# Patient Record
Sex: FEMALE | Race: WHITE | NOT HISPANIC OR LATINO | Employment: FULL TIME | URBAN - METROPOLITAN AREA
[De-identification: names, ages, dates, MRNs, and addresses within clinical notes are randomized per-mention and may not be internally consistent; named-entity substitution may affect disease eponyms.]

---

## 2023-02-21 PROBLEM — E66.812 CLASS 2 SEVERE OBESITY DUE TO EXCESS CALORIES WITH SERIOUS COMORBIDITY AND BODY MASS INDEX (BMI) OF 35.0 TO 35.9 IN ADULT: Status: ACTIVE | Noted: 2023-02-21

## 2023-02-21 PROBLEM — I10 BENIGN ESSENTIAL HYPERTENSION: Status: ACTIVE | Noted: 2023-02-21

## 2023-02-21 PROBLEM — F41.9 ANXIETY: Status: ACTIVE | Noted: 2023-02-21

## 2023-02-21 PROBLEM — J30.2 SEASONAL ALLERGIES: Status: ACTIVE | Noted: 2023-02-21

## 2023-02-21 PROBLEM — E66.01 CLASS 2 SEVERE OBESITY DUE TO EXCESS CALORIES WITH SERIOUS COMORBIDITY AND BODY MASS INDEX (BMI) OF 35.0 TO 35.9 IN ADULT (MULTI): Status: ACTIVE | Noted: 2023-02-21

## 2023-02-21 PROBLEM — R16.1 SPLENOMEGALY: Status: ACTIVE | Noted: 2023-02-21

## 2023-02-21 PROBLEM — K21.9 ACID REFLUX: Status: ACTIVE | Noted: 2023-02-21

## 2023-02-21 PROBLEM — R91.1 NODULE OF MIDDLE LOBE OF RIGHT LUNG: Status: ACTIVE | Noted: 2023-02-21

## 2023-02-21 PROBLEM — G47.00 INSOMNIA: Status: ACTIVE | Noted: 2023-02-21

## 2023-02-21 PROBLEM — E78.2 MIXED HYPERLIPIDEMIA: Status: ACTIVE | Noted: 2023-02-21

## 2023-02-21 RX ORDER — LISINOPRIL 10 MG/1
1 TABLET ORAL DAILY
COMMUNITY
Start: 2021-11-15 | End: 2023-03-24 | Stop reason: SDUPTHER

## 2023-02-21 RX ORDER — TRAZODONE HYDROCHLORIDE 50 MG/1
1-2 TABLET ORAL NIGHTLY PRN
COMMUNITY
Start: 2021-10-26 | End: 2023-03-24

## 2023-02-21 RX ORDER — ALPRAZOLAM 0.25 MG/1
1 TABLET ORAL 3 TIMES DAILY PRN
COMMUNITY
Start: 2021-09-24 | End: 2023-03-24 | Stop reason: SDUPTHER

## 2023-03-17 ASSESSMENT — PROMIS GLOBAL HEALTH SCALE
CARRYOUT_SOCIAL_ACTIVITIES: GOOD
RATE_PHYSICAL_HEALTH: FAIR
EMOTIONAL_PROBLEMS: OFTEN
CARRYOUT_PHYSICAL_ACTIVITIES: COMPLETELY
RATE_GENERAL_HEALTH: GOOD
RATE_MENTAL_HEALTH: FAIR
RATE_QUALITY_OF_LIFE: GOOD
RATE_SOCIAL_SATISFACTION: GOOD
RATE_AVERAGE_FATIGUE: MODERATE
RATE_AVERAGE_PAIN: 0

## 2023-03-23 ASSESSMENT — ENCOUNTER SYMPTOMS
VOMITING: 0
DIZZINESS: 0
EYE REDNESS: 0
CHILLS: 0
COUGH: 0
FREQUENCY: 0
HEADACHES: 0
APPETITE CHANGE: 0
CONFUSION: 0
FATIGUE: 0
WOUND: 0
UNEXPECTED WEIGHT CHANGE: 0
DYSURIA: 0
BACK PAIN: 0
NECK PAIN: 0
ABDOMINAL PAIN: 0
EYE DISCHARGE: 0
PALPITATIONS: 0
POLYPHAGIA: 0
FEVER: 0
BLOOD IN STOOL: 0
POLYDIPSIA: 0
BRUISES/BLEEDS EASILY: 0
EYE PAIN: 0
TROUBLE SWALLOWING: 0
HEMATURIA: 0
SORE THROAT: 0
ADENOPATHY: 0
SHORTNESS OF BREATH: 0
HALLUCINATIONS: 0

## 2023-03-23 NOTE — PROGRESS NOTES
Subjective   Patient ID: Jeanne Myers is a 37 y.o. female who presents for Annual Exam (Is pt fasting? No/Does pt see any providers other than me? No /Last pap? Never had one/Does pt want pap today? No/Does pt want me to refer her to ob gyn? No/Bps at home - Averages around 130/90/Is pt taking fish oil 2 bid? yes/Did pt check insurance for coverage for the more specialized cholesterol test (NMR)? Not yet,  new insurance as well/If pt will need xanax refilled in the next 6mon then she will need new csa and uds?  Discuss it first/Last xanax use- Around December 2022/Phq9, gad7/) and Anxiety.  Is pt fasting? no  Does pt see any providers other than me? no    Last pap? never  Does pt want pap today? no  Does pt want me to refer her to ob gyn?  Bps at home 130s/90 avg  Is pt taking fish oil 2 bid? yes  Did pt check insurance for coverage for the more specialized cholesterol test (NMR)? no  If pt will need xanax refilled in the next 6mon then she will need new csa and uds?  Last xanax use-12/2022        No care team member to display    HPI  Last labs-2/2022 ldl 102, hdl 41, trigs 245; 9/2021 tsh, cbc, cmp-all nl  Due for labs- all    Phq9=11 , gad7=13  No new stresses  No meds in the past for anxiety  Therapist >10yrs ago  No psychiatrist  Fh mental illness: none    Can fall asleep  Off diet  Wants to restart exercise  No suicidal thoughts or plans    OARRS:  No data recorded  I have personally reviewed the OARRS report for Xiomara Myers. I have considered the risks of abuse, dependence, addiction and diversion    Is the patient prescribed a combination of a benzodiazepine and opioid?  No    Last Urine Drug Screen / ordered today: Yes  Recent Results (from the past 52694 hour(s))   OPIATE/OPIOID/BENZO PRESCRIPTION COMPLIANCE    Collection Time: 09/24/21  9:35 AM   Result Value Ref Range    DRUG SCREEN COMMENT URINE SEE BELOW     Creatine, Urine 146.7 mg/dL    Amphetamine Screen, Urine PRESUMPTIVE NEGATIVE NEGATIVE     Barbiturate Screen, Urine PRESUMPTIVE NEGATIVE NEGATIVE    Cannabinoid Screen, Urine PRESUMPTIVE NEGATIVE NEGATIVE    Cocaine Screen, Urine PRESUMPTIVE NEGATIVE NEGATIVE    PCP Screen, Urine PRESUMPTIVE NEGATIVE NEGATIVE    7-Aminoclonazepam <25 Cutoff <25 ng/mL    Alpha-Hydroxyalprazolam <25 Cutoff <25 ng/mL    Alpha-Hydroxymidazolam <25 Cutoff <25 ng/mL    Alprazolam <25 Cutoff <25 ng/mL    Chlordiazepoxide <25 Cutoff <25 ng/mL    Clonazepam <25 Cutoff <25 ng/mL    Diazepam <25 Cutoff <25 ng/mL    Lorazepam <25 Cutoff <25 ng/mL    Midazolam <25 Cutoff <25 ng/mL    Nordiazepam <25 Cutoff <25 ng/mL    Oxazepam <25 Cutoff <25 ng/mL    Temazepam <25 Cutoff <25 ng/mL    Zolpidem <25 Cutoff <25 ng/mL    Zolpidem Metabolite (ZCA) <25 Cutoff <25 ng/mL    6-Acetylmorphine <25 Cutoff <25 ng/mL    Codeine <50 Cutoff <50 ng/mL    Hydrocodone <25 Cutoff <25 ng/mL    Hydromorphone <25 Cutoff <25 ng/mL    Morphine Urine <50 Cutoff <50 ng/mL    Norhydrocodone <25 Cutoff <25 ng/mL    Noroxycodone <25 Cutoff <25 ng/mL    Oxycodone <25 Cutoff <25 ng/mL    Oxymorphone <25 Cutoff <25 ng/mL    Tramadol <50 Cutoff <50 ng/mL    O-Desmethyltramadol <50 Cutoff <50 ng/mL    Fentanyl <2.5 Cutoff<2.5 ng/mL    Norfentanyl <2.5 Cutoff<2.5 ng/mL    METHADONE CONFIRMATION,URINE <25 Cutoff <25 ng/mL    EDDP <25 Cutoff <25 ng/mL     N/A    Controlled Substance Agreement:  Date of the Last Agreement: today  Reviewed Controlled Substance Agreement including but not limited to the benefits, risks, and alternatives to treatment with a Controlled Substance medication(s).    Benzodiazepines:  What is the patient's goal of therapy? Decr anxiety  Is this being achieved with current treatment? no    STEFAN-7:  No data recorded    Activities of Daily Living:   Is your overall impression that this patient is benefiting (symptom reduction outweighs side effects) from benzodiazepine therapy? Yes but only temporary, starting pt on daily med-fluoxetine    1.  Physical Functioning: Same  2. Family Relationship: Same  3. Social Relationship: Same  4. Mood: Same  5. Sleep Patterns: Same  6. Overall Function: Same    Cholesterol   Date Value Ref Range Status   02/16/2022 192 0 - 199 mg/dL Final     Comment:     .      AGE      DESIRABLE   BORDERLINE HIGH   HIGH     0-19 Y     0 - 169       170 - 199     >/= 200    20-24 Y     0 - 189       190 - 224     >/= 225         >24 Y     0 - 199       200 - 239     >/= 240   **All ranges are based on fasting samples. Specific   therapeutic targets will vary based on patient-specific   cardiac risk.  .   Pediatric guidelines reference:Pediatrics 2011, 128(S5).   Adult guidelines reference: NCEP ATPIII Guidelines,     PAO 2001, 258:2486-97  .   Venipuncture immediately after or during the    administration of Metamizole may lead to falsely   low results. Testing should be performed immediately   prior to Metamizole dosing.     09/03/2021 204 (H) 0 - 199 mg/dL Final     Comment:     .      AGE      DESIRABLE   BORDERLINE HIGH   HIGH     0-19 Y     0 - 169       170 - 199     >/= 200    20-24 Y     0 - 189       190 - 224     >/= 225         >24 Y     0 - 199       200 - 239     >/= 240   **All ranges are based on fasting samples. Specific   therapeutic targets will vary based on patient-specific   cardiac risk.  .   Pediatric guidelines reference:Pediatrics 2011, 128(S5).   Adult guidelines reference: NCEP ATPIII Guidelines,     PAO 2001, 258:2486-97  .   Venipuncture immediately after or during the    administration of Metamizole may lead to falsely   low results. Testing should be performed immediately   prior to Metamizole dosing.       Triglycerides   Date Value Ref Range Status   02/16/2022 245 (H) 0 - 149 mg/dL Final     Comment:     .      AGE      DESIRABLE   BORDERLINE HIGH   HIGH     VERY HIGH   0 D-90 D    19 - 174         ----         ----        ----  91 D- 9 Y     0 -  74        75 -  99     >/= 100      ----    10-19 Y      0 -  89        90 - 129     >/= 130      ----    20-24 Y     0 - 114       115 - 149     >/= 150      ----         >24 Y     0 - 149       150 - 199    200- 499    >/= 500  .   Venipuncture immediately after or during the    administration of Metamizole may lead to falsely   low results. Testing should be performed immediately   prior to Metamizole dosing.     09/03/2021 303 (H) 0 - 149 mg/dL Final     Comment:     .      AGE      DESIRABLE   BORDERLINE HIGH   HIGH     VERY HIGH   0 D-90 D    19 - 174         ----         ----        ----  91 D- 9 Y     0 -  74        75 -  99     >/= 100      ----    10-19 Y     0 -  89        90 - 129     >/= 130      ----    20-24 Y     0 - 114       115 - 149     >/= 150      ----         >24 Y     0 - 149       150 - 199    200- 499    >/= 500  .   Venipuncture immediately after or during the    administration of Metamizole may lead to falsely   low results. Testing should be performed immediately   prior to Metamizole dosing.       HDL   Date Value Ref Range Status   02/16/2022 41.3 mg/dL Final     Comment:     .      AGE      VERY LOW   LOW     NORMAL    HIGH       0-19 Y       < 35   < 40     40-45     ----    20-24 Y       ----   < 40       >45     ----      >24 Y       ----   < 40     40-60      >60  .     09/03/2021 40.5 mg/dL Final     Comment:     .      AGE      VERY LOW   LOW     NORMAL    HIGH       0-19 Y       < 35   < 40     40-45     ----    20-24 Y       ----   < 40       >45     ----      >24 Y       ----   < 40     40-60      >60  .       No results found for: LDL  TSH   Date Value Ref Range Status   09/03/2021 2.57 0.44 - 3.98 mIU/L Final     Comment:      TSH testing is performed using different testing    methodology at [facility] than at other    [facility] hospitals. Direct result comparisons should    only be made within the same method.       No components found for: A1C  No components found for: POCA1C  No results found for: ALBUR  No  components found for: POCALBUR      Other concerns: since taking bp med, tickle at times; helps to drink more water    bps at home- no    ER/urgicare visits in the last year- none  Hospitalizations in the last year- none      last Pap- never  H/o abn pap-n/a  Frequency-longer 45d  Duration-4-6d  Heavy periods-lighter lately with higher wt  Abn uterine bleeding-none  Dysmenorrhea-none  FH ovarian, endometrial, cervical, uterine ca-none    Current birth control method-declines  No change in contraception desired    last mammo- (40-75/90) 2021  Self breast exams-occas    FH br ca-gr aunt pat      FH colon ca-none      Exercise- getting back to it  Diet-off diet   Body mass index is 39.32 kg/m².    last eye dr appt- glasses;1 1/2 yrs ago  No vision issues    last dental appt- 3mon    BMs-regular  Sleep-able to fall asleep but hard to stay asleep; no snoring or apnea; trazodone if not working caused anxiety so stopped taking it.  no cp, swelling, sob, abd pain, n/v/d/c, blood in stool or black stools  STI testing including hiv (age 15-65) and hep c screening (18-79)-declines        Immunization History   Administered Date(s) Administered    Influenza, seasonal, injectable 09/24/2021    Moderna SARS-CoV-2 Vaccination 04/14/2021, 05/15/2021, 01/10/2022    Tdap 05/22/2014       fractures in lifetime-finger, toe, arm      FH heart attack, heart surgery-pgm,pgf, dad-mi  FH stroke-none  Ct cardiac score 3/2022=0    The ASCVD Risk score (Fozia DK, et al., 2019) failed to calculate for the following reasons:    The 2019 ASCVD risk score is only valid for ages 40 to 79        Review of Systems   Constitutional:  Negative for appetite change, chills, fatigue, fever and unexpected weight change.   HENT:  Negative for congestion, ear pain, sore throat and trouble swallowing.    Eyes:  Negative for pain, discharge and redness.   Respiratory:  Negative for cough and shortness of breath.    Cardiovascular:  Negative for chest pain and  palpitations.   Gastrointestinal:  Negative for abdominal pain, blood in stool and vomiting.   Endocrine: Negative for polydipsia, polyphagia and polyuria.   Genitourinary:  Negative for dysuria, frequency, hematuria and urgency.   Musculoskeletal:  Negative for back pain and neck pain.   Skin:  Negative for rash and wound.   Allergic/Immunologic: Negative for immunocompromised state.   Neurological:  Negative for dizziness, syncope and headaches.   Hematological:  Negative for adenopathy. Does not bruise/bleed easily.   Psychiatric/Behavioral:  Negative for confusion and hallucinations.        Objective   Visit Vitals  /87   Pulse 89      BP Readings from Last 3 Encounters:   03/24/23 141/87   02/21/22 130/80   12/02/21 (!) 142/96     Wt Readings from Last 3 Encounters:   03/24/23 117 kg (258 lb 9.6 oz)   02/21/22 105 kg (231 lb 4 oz)   12/02/21 104 kg (229 lb)           Physical Exam  Constitutional:       General: She is not in acute distress.     Appearance: Normal appearance. She is not ill-appearing.   HENT:      Head: Normocephalic.      Right Ear: Tympanic membrane, ear canal and external ear normal.      Left Ear: Tympanic membrane, ear canal and external ear normal.      Nose: Nose normal.      Mouth/Throat:      Mouth: Mucous membranes are moist.      Pharynx: Oropharynx is clear.   Eyes:      Extraocular Movements: Extraocular movements intact.      Conjunctiva/sclera: Conjunctivae normal.      Pupils: Pupils are equal, round, and reactive to light.   Cardiovascular:      Rate and Rhythm: Normal rate and regular rhythm.      Heart sounds: Normal heart sounds. No murmur heard.  Pulmonary:      Effort: Pulmonary effort is normal. No respiratory distress.      Breath sounds: Normal breath sounds. No wheezing, rhonchi or rales.   Abdominal:      General: Bowel sounds are normal.      Palpations: Abdomen is soft. There is no mass.      Tenderness: There is no abdominal tenderness.   Musculoskeletal:          General: No swelling or tenderness. Normal range of motion.      Cervical back: Normal range of motion and neck supple.      Right lower leg: No edema.      Left lower leg: No edema.   Skin:     General: Skin is warm.      Findings: No rash.   Neurological:      General: No focal deficit present.      Mental Status: She is alert and oriented to person, place, and time.      Cranial Nerves: No cranial nerve deficit.      Motor: No weakness.   Psychiatric:         Mood and Affect: Mood normal.         Behavior: Behavior normal.       Assessment/Plan   Diagnoses and all orders for this visit:  Routine general medical examination at a health care facility  -     Comprehensive Metabolic Panel; Future  -     CBC and Auto Differential; Future  -     Lipid Panel; Future  -     TSH with reflex to Free T4 if abnormal; Future  Encounter for screening mammogram for malignant neoplasm of breast  -     BI mammo bilateral screening tomosynthesis; Future  Anxiety  -     ALPRAZolam (Xanax) 0.25 mg tablet; Take 1 tablet (0.25 mg) by mouth 3 times a day as needed for anxiety.  -     FLUoxetine (PROzac) 10 mg capsule; Take 1 capsule (10 mg) by mouth once daily.  Benign essential hypertension  -     lisinopril 10 mg tablet; Take 1 tablet (10 mg) by mouth once daily.  Mixed hyperlipidemia  BMI 39.0-39.9,adult  Class 2 severe obesity due to excess calories with serious comorbidity and body mass index (BMI) of 39.0 to 39.9 in adult (CMS/[facility])

## 2023-03-24 ENCOUNTER — OFFICE VISIT (OUTPATIENT)
Dept: PRIMARY CARE | Facility: CLINIC | Age: 38
End: 2023-03-24
Payer: COMMERCIAL

## 2023-03-24 VITALS
DIASTOLIC BLOOD PRESSURE: 68 MMHG | BODY MASS INDEX: 39.19 KG/M2 | HEIGHT: 68 IN | WEIGHT: 258.6 LBS | OXYGEN SATURATION: 97 % | HEART RATE: 89 BPM | SYSTOLIC BLOOD PRESSURE: 138 MMHG

## 2023-03-24 DIAGNOSIS — E78.2 MIXED HYPERLIPIDEMIA: ICD-10-CM

## 2023-03-24 DIAGNOSIS — F41.9 ANXIETY: ICD-10-CM

## 2023-03-24 DIAGNOSIS — E66.01 CLASS 2 SEVERE OBESITY DUE TO EXCESS CALORIES WITH SERIOUS COMORBIDITY AND BODY MASS INDEX (BMI) OF 39.0 TO 39.9 IN ADULT (MULTI): ICD-10-CM

## 2023-03-24 DIAGNOSIS — Z00.00 ROUTINE GENERAL MEDICAL EXAMINATION AT A HEALTH CARE FACILITY: Primary | ICD-10-CM

## 2023-03-24 DIAGNOSIS — I10 BENIGN ESSENTIAL HYPERTENSION: ICD-10-CM

## 2023-03-24 DIAGNOSIS — Z12.31 ENCOUNTER FOR SCREENING MAMMOGRAM FOR MALIGNANT NEOPLASM OF BREAST: ICD-10-CM

## 2023-03-24 DIAGNOSIS — Z79.899 MEDICATION MANAGEMENT: ICD-10-CM

## 2023-03-24 PROBLEM — E66.812 CLASS 2 SEVERE OBESITY DUE TO EXCESS CALORIES WITH SERIOUS COMORBIDITY AND BODY MASS INDEX (BMI) OF 35.0 TO 35.9 IN ADULT: Status: RESOLVED | Noted: 2023-02-21 | Resolved: 2023-03-24

## 2023-03-24 PROCEDURE — 3079F DIAST BP 80-89 MM HG: CPT | Performed by: NURSE PRACTITIONER

## 2023-03-24 PROCEDURE — 80373 DRUG SCREENING TRAMADOL: CPT

## 2023-03-24 PROCEDURE — 1036F TOBACCO NON-USER: CPT | Performed by: NURSE PRACTITIONER

## 2023-03-24 PROCEDURE — 99395 PREV VISIT EST AGE 18-39: CPT | Performed by: NURSE PRACTITIONER

## 2023-03-24 PROCEDURE — 3075F SYST BP GE 130 - 139MM HG: CPT | Performed by: NURSE PRACTITIONER

## 2023-03-24 PROCEDURE — 80358 DRUG SCREENING METHADONE: CPT

## 2023-03-24 PROCEDURE — 80346 BENZODIAZEPINES1-12: CPT

## 2023-03-24 PROCEDURE — 80361 OPIATES 1 OR MORE: CPT

## 2023-03-24 PROCEDURE — 80365 DRUG SCREENING OXYCODONE: CPT

## 2023-03-24 PROCEDURE — 80354 DRUG SCREENING FENTANYL: CPT

## 2023-03-24 PROCEDURE — 3077F SYST BP >= 140 MM HG: CPT | Performed by: NURSE PRACTITIONER

## 2023-03-24 PROCEDURE — 3008F BODY MASS INDEX DOCD: CPT | Performed by: NURSE PRACTITIONER

## 2023-03-24 PROCEDURE — 3078F DIAST BP <80 MM HG: CPT | Performed by: NURSE PRACTITIONER

## 2023-03-24 PROCEDURE — 80368 SEDATIVE HYPNOTICS: CPT

## 2023-03-24 PROCEDURE — 80307 DRUG TEST PRSMV CHEM ANLYZR: CPT

## 2023-03-24 RX ORDER — LISINOPRIL 10 MG/1
10 TABLET ORAL DAILY
Qty: 90 TABLET | Refills: 1 | Status: SHIPPED | OUTPATIENT
Start: 2023-03-24 | End: 2023-04-14 | Stop reason: SINTOL

## 2023-03-24 RX ORDER — ALPRAZOLAM 0.25 MG/1
0.25 TABLET ORAL 3 TIMES DAILY PRN
Qty: 30 TABLET | Refills: 1 | Status: SHIPPED | OUTPATIENT
Start: 2023-03-24 | End: 2024-04-18 | Stop reason: SDUPTHER

## 2023-03-24 RX ORDER — FLUOXETINE 10 MG/1
10 CAPSULE ORAL DAILY
Qty: 30 CAPSULE | Refills: 1 | Status: SHIPPED | OUTPATIENT
Start: 2023-03-24 | End: 2023-04-14 | Stop reason: SDUPTHER

## 2023-03-24 ASSESSMENT — PATIENT HEALTH QUESTIONNAIRE - PHQ9
4. FEELING TIRED OR HAVING LITTLE ENERGY: SEVERAL DAYS
SUM OF ALL RESPONSES TO PHQ9 QUESTIONS 1 AND 2: 1
10. IF YOU CHECKED OFF ANY PROBLEMS, HOW DIFFICULT HAVE THESE PROBLEMS MADE IT FOR YOU TO DO YOUR WORK, TAKE CARE OF THINGS AT HOME, OR GET ALONG WITH OTHER PEOPLE: SOMEWHAT DIFFICULT
7. TROUBLE CONCENTRATING ON THINGS, SUCH AS READING THE NEWSPAPER OR WATCHING TELEVISION: MORE THAN HALF THE DAYS
1. LITTLE INTEREST OR PLEASURE IN DOING THINGS: SEVERAL DAYS
SUM OF ALL RESPONSES TO PHQ QUESTIONS 1-9: 11
5. POOR APPETITE OR OVEREATING: NEARLY EVERY DAY
3. TROUBLE FALLING OR STAYING ASLEEP OR SLEEPING TOO MUCH: NEARLY EVERY DAY
6. FEELING BAD ABOUT YOURSELF - OR THAT YOU ARE A FAILURE OR HAVE LET YOURSELF OR YOUR FAMILY DOWN: SEVERAL DAYS
2. FEELING DOWN, DEPRESSED OR HOPELESS: NOT AT ALL
9. THOUGHTS THAT YOU WOULD BE BETTER OFF DEAD, OR OF HURTING YOURSELF: NOT AT ALL
8. MOVING OR SPEAKING SO SLOWLY THAT OTHER PEOPLE COULD HAVE NOTICED. OR THE OPPOSITE, BEING SO FIGETY OR RESTLESS THAT YOU HAVE BEEN MOVING AROUND A LOT MORE THAN USUAL: NOT AT ALL

## 2023-03-24 ASSESSMENT — ANXIETY QUESTIONNAIRES
3. WORRYING TOO MUCH ABOUT DIFFERENT THINGS: MORE THAN HALF THE DAYS
1. FEELING NERVOUS, ANXIOUS, OR ON EDGE: NEARLY EVERY DAY
5. BEING SO RESTLESS THAT IT IS HARD TO SIT STILL: MORE THAN HALF THE DAYS
GAD7 TOTAL SCORE: 13
4. TROUBLE RELAXING: MORE THAN HALF THE DAYS
6. BECOMING EASILY ANNOYED OR IRRITABLE: MORE THAN HALF THE DAYS
2. NOT BEING ABLE TO STOP OR CONTROL WORRYING: MORE THAN HALF THE DAYS
7. FEELING AFRAID AS IF SOMETHING AWFUL MIGHT HAPPEN: NOT AT ALL

## 2023-03-24 NOTE — PATIENT INSTRUCTIONS
Check insurance coverage for nmr [id number] and [id number]    Meds refilled. The number of refills on the meds match when you need to return to the office for an appt. I recommend making your next appt today so you don't run out of your medication as it may take me up to 3 days to refill it.    Bring in 7d of bps to next appt  Goal <140/<90    Start fluoxetine  Use xanax as needed  Call if sx worsen or change     Handouts given to pt:  physical handout    stop smoking    Need records from:    I recommend signing up for MyChart.    Labs:    You can use the lab in our building when fasting. The hrs are: Monday-Thursday, 7 a.m. - 6 p.m., Friday, 7 a.m. - 5 p.m., Saturday 8 a.m. - 12 noon.   No appt needed, BUT YOU DO NEED THE PAPER ORDER.    Fasting is no food, drink, gum or mints other than water for 12 hrs.   Results will be back in 2-3 business days for most labs. It is always recommended for any orders (labs, xrays, ultrasounds,MRI, ct scan, procedures etc) to check with your insurance provider for expected costs or expenses to you.     Screenings:  Pap results back in 7-14 days.  To set up mammogram, call [phone]    You will get your results via phone from my medical assistant if you do not have MyChart.  OR  You will get your results via [employer]    If a result is urgent, I will call to speak to you.    Vaccines:  ---- Tdap    ---- Flu shot    ---- Shingrix    ---- [id number]    General recommendations:  Exercise-cardio 4-5d/wk 30min each day  Diet-Breakfast-toast (my favorite Nichelle Alan Delighful Multigrain or Mann's Killer Bread Good Seed thin-sliced)/bagel/English muffin-whole wheat flour as a 1st ingredient or cereal/oatmeal/granola bar-fiber 4g or more or protein like eggs or peanut butter; optional veggies  Lunch-protein, 1/2c carb or 2 slices bread, veg 1c  Dinner-protein, fist sized carb, veg 1c  Fruit 2 a day  Dairy 2 a day-milk, soy milk, almond milk, cheese, yogurt, cottage  cheese  Snacks-Protein-hard boiled egg, nuts (walnuts/almonds/pecans/pistachios 1/4c), hummus, beef/deer jerky or meat sticks; vegetable, fruit, dairy-milk(1%, skim, almond, soy)/cheese (not a lot of cheddar)/yogurt (Greek is best-my favorite Dannon Fruit on the Bottom Greek)/cottage cheese 2%; triscuits/ popcorn/wheat thins have a lot of fiber; follow serving size on bag/box/container  increase water  Limit alcohol to 1 drink per day for women and 2 drinks per day for men (1 drink=12oz beer or 5oz wine or 1 1/2oz liquor)  Calcium: 500mg 1 twice a day if age 50 and younger and 600mg 1 twice a day if over age 50 (calcium citrate can be taken without food)  Vitamin D: 800-5000 IU/day  Limit salt to <2300mg a day if age 50 and under and <1500mg a day if over age 50/have high bp or diabetes or kidney disease  Recommend folate for childbearing age women 0.4mg per day (can be found in a multivitamin)  Recommend 18mg/dL of iron a day if age 50 and under and 8mg/dL a day if over age 50; take on an empty stomach at bedtime  Use sunscreen   Wear seatbelt  Recommend safe sex practices: using condoms everytime you have sex, discuss with a new partner about their past partners/history of STDs/drug use, avoid drinking alcohol or using drugs as this increases the chance that you will participate in high-risk sex, for oral sex help protect your mouth by having your partner use a condom (male or female), women should not douche after sex, be aware of your partner's body and your body-look for signs of a sore, blister, rash, or discharge, and have regular exams and periodic tests for STDs.  No distracted driving  No driving when under influence of substances  Wear a seatbelt  Eye dr every 1-2yrs  Dentist every 6-12 mon  Tetanus shot every 10yrs  Recommend flu vaccine in the fall  Appt in 3wks for anxiety/bp and 1 year for physical      I will communicate with you via [employer] regarding messages and results. If you need help with this,  you can call the support line at [phone].    IT WAS A PLEASURE TO SEE YOU TODAY. THANK YOU FOR CHOOSING US FOR YOUR HEALTHCARE NEEDS.

## 2023-03-30 LAB
6-ACETYLMORPHINE: <25 NG/ML
7-AMINOCLONAZEPAM: <25 NG/ML
ALPHA-HYDROXYALPRAZOLAM: <25 NG/ML
ALPHA-HYDROXYMIDAZOLAM: <25 NG/ML
ALPRAZOLAM: <25 NG/ML
AMPHETAMINE (PRESENCE) IN URINE BY SCREEN METHOD: NORMAL
BARBITURATES PRESENCE IN URINE BY SCREEN METHOD: NORMAL
CANNABINOIDS IN URINE BY SCREEN METHOD: NORMAL
CHLORDIAZEPOXIDE: <25 NG/ML
CLONAZEPAM: <25 NG/ML
COCAINE (PRESENCE) IN URINE BY SCREEN METHOD: NORMAL
CODEINE: <50 NG/ML
CREATINE, URINE FOR DRUG: 126.3 MG/DL
DIAZEPAM: <25 NG/ML
DRUG SCREEN COMMENT URINE: NORMAL
EDDP: <25 NG/ML
FENTANYL CONFIRMATION, URINE: <2.5 NG/ML
HYDROCODONE: <25 NG/ML
HYDROMORPHONE: <25 NG/ML
LORAZEPAM: <25 NG/ML
METHADONE CONFIRMATION,URINE: <25 NG/ML
MIDAZOLAM: <25 NG/ML
MORPHINE URINE: <50 NG/ML
NORDIAZEPAM: <25 NG/ML
NORFENTANYL: <2.5 NG/ML
NORHYDROCODONE: <25 NG/ML
NOROXYCODONE: <25 NG/ML
O-DESMETHYLTRAMADOL: <50 NG/ML
OXAZEPAM: <25 NG/ML
OXYCODONE: <25 NG/ML
OXYMORPHONE: <25 NG/ML
PHENCYCLIDINE (PRESENCE) IN URINE BY SCREEN METHOD: NORMAL
TEMAZEPAM: <25 NG/ML
TRAMADOL: <50 NG/ML
ZOLPIDEM METABOLITE (ZCA): <25 NG/ML
ZOLPIDEM: <25 NG/ML

## 2023-04-13 ASSESSMENT — ENCOUNTER SYMPTOMS
SHORTNESS OF BREATH: 0
WHEEZING: 0
CONSTITUTIONAL NEGATIVE: 1

## 2023-04-13 NOTE — PROGRESS NOTES
Subjective   Patient ID: Jeanne Myers is a 37 y.o. female who presents for Follow-up (Medication follow-up blood pressures lisinopril making her cough on and off. Wakes her up at night./Bp's Not checking/Last used xanax 03/28/Fasting-No).  Last physical: 3/24/23  Bps at home-none  Last use of xanax-3/28/23  Is pt fasting? no  Phq9, gad7    HPI  On lisinopril 10mg a day-making her cough on off, even at night    Seen 3/24/23 for anxiety and started fluoxetine  Not sure if making body tired  3/24/23 Phq9=11, today= 5  3/24/23 gad7=13, today=3  Last use of xanax  No new stresses  No meds in the past for anxiety  Therapist >10yrs ago  No psychiatrist  Fh mental illness: none    Can process things despite anxiety  Better attitude  Easier to manage thruout the day     Can fall asleep  Off diet  Wants to restart exercise  No suicidal thoughts or plans     OARRS:  No data recorded  I have personally reviewed the OARRS report for Xiomara Myers. I have considered the risks of abuse, dependence, addiction and diversion     Is the patient prescribed a combination of a benzodiazepine and opioid?  No     Last Urine Drug Screen / ordered today: 3/24/23  Csa 3/24/23         no delusions, hallucinations, uncontrollable/purposeless mvmts, or fixed/inflexible posture  no extreme mood swings that include emotional highs and lows,      Abnormally upbeat, jumpy or wired,      Increased activity, energy or agitation,      Exaggerated sense of well-being and self-confidence (euphoria),      Irritable,      Decreased need for sleep,      Unusual talkativeness,      Racing thoughts,      Distractibility,      Poor decision-making - for example, going on buying sprees, taking sexual risks or making foolish investments           affects sleep, energy, activity, judgment, behavior and the ability to think clearly.          No care team member to display     Review of Systems   Constitutional: Negative.    Respiratory:  Negative for shortness  of breath and wheezing.    Cardiovascular:  Negative for chest pain.       Objective   Visit Vitals  BP (!) 154/96   Pulse 85   Temp 37 °C (98.6 °F) (Oral)      BP Readings from Last 3 Encounters:   04/14/23 (!) 154/96   03/24/23 138/68   02/21/22 130/80     Wt Readings from Last 3 Encounters:   04/14/23 117 kg (259 lb)   03/24/23 117 kg (258 lb 9.6 oz)   02/21/22 105 kg (231 lb 4 oz)       Physical Exam  Constitutional:       General: She is not in acute distress.     Appearance: Normal appearance.   Neurological:      Mental Status: She is alert.         Assessment/Plan   Diagnoses and all orders for this visit:  Benign essential hypertension  -     losartan (Cozaar) 50 mg tablet; Take 1 tablet (50 mg) by mouth once daily.  Anxiety  -     FLUoxetine (PROzac) 10 mg capsule; Take 1 capsule (10 mg) by mouth once daily.  Other orders  -     Follow Up In Primary Care  -     Follow Up In Primary Care; Future

## 2023-04-14 ENCOUNTER — OFFICE VISIT (OUTPATIENT)
Dept: PRIMARY CARE | Facility: CLINIC | Age: 38
End: 2023-04-14
Payer: COMMERCIAL

## 2023-04-14 VITALS
HEIGHT: 68 IN | BODY MASS INDEX: 39.25 KG/M2 | WEIGHT: 259 LBS | OXYGEN SATURATION: 96 % | DIASTOLIC BLOOD PRESSURE: 74 MMHG | SYSTOLIC BLOOD PRESSURE: 126 MMHG | TEMPERATURE: 98.6 F | HEART RATE: 85 BPM

## 2023-04-14 DIAGNOSIS — F41.9 ANXIETY: ICD-10-CM

## 2023-04-14 DIAGNOSIS — I10 BENIGN ESSENTIAL HYPERTENSION: Primary | ICD-10-CM

## 2023-04-14 PROCEDURE — 99213 OFFICE O/P EST LOW 20 MIN: CPT | Performed by: NURSE PRACTITIONER

## 2023-04-14 PROCEDURE — 3080F DIAST BP >= 90 MM HG: CPT | Performed by: NURSE PRACTITIONER

## 2023-04-14 PROCEDURE — 3074F SYST BP LT 130 MM HG: CPT | Performed by: NURSE PRACTITIONER

## 2023-04-14 PROCEDURE — 3077F SYST BP >= 140 MM HG: CPT | Performed by: NURSE PRACTITIONER

## 2023-04-14 PROCEDURE — 1036F TOBACCO NON-USER: CPT | Performed by: NURSE PRACTITIONER

## 2023-04-14 PROCEDURE — 3078F DIAST BP <80 MM HG: CPT | Performed by: NURSE PRACTITIONER

## 2023-04-14 PROCEDURE — 3008F BODY MASS INDEX DOCD: CPT | Performed by: NURSE PRACTITIONER

## 2023-04-14 RX ORDER — FLUOXETINE 10 MG/1
10 CAPSULE ORAL DAILY
Qty: 30 CAPSULE | Refills: 5 | Status: SHIPPED | OUTPATIENT
Start: 2023-04-14 | End: 2023-05-31 | Stop reason: SINTOL

## 2023-04-14 RX ORDER — LOSARTAN POTASSIUM 50 MG/1
50 TABLET ORAL DAILY
Qty: 30 TABLET | Refills: 5 | Status: SHIPPED | OUTPATIENT
Start: 2023-04-14 | End: 2023-05-04 | Stop reason: ALTCHOICE

## 2023-04-14 ASSESSMENT — PATIENT HEALTH QUESTIONNAIRE - PHQ9
10. IF YOU CHECKED OFF ANY PROBLEMS, HOW DIFFICULT HAVE THESE PROBLEMS MADE IT FOR YOU TO DO YOUR WORK, TAKE CARE OF THINGS AT HOME, OR GET ALONG WITH OTHER PEOPLE: NOT DIFFICULT AT ALL
1. LITTLE INTEREST OR PLEASURE IN DOING THINGS: NOT AT ALL
6. FEELING BAD ABOUT YOURSELF - OR THAT YOU ARE A FAILURE OR HAVE LET YOURSELF OR YOUR FAMILY DOWN: NOT AT ALL
5. POOR APPETITE OR OVEREATING: NOT AT ALL
4. FEELING TIRED OR HAVING LITTLE ENERGY: MORE THAN HALF THE DAYS
1. LITTLE INTEREST OR PLEASURE IN DOING THINGS: SEVERAL DAYS
7. TROUBLE CONCENTRATING ON THINGS, SUCH AS READING THE NEWSPAPER OR WATCHING TELEVISION: NOT AT ALL
2. FEELING DOWN, DEPRESSED OR HOPELESS: NOT AT ALL
9. THOUGHTS THAT YOU WOULD BE BETTER OFF DEAD, OR OF HURTING YOURSELF: NOT AT ALL
3. TROUBLE FALLING OR STAYING ASLEEP OR SLEEPING TOO MUCH: MORE THAN HALF THE DAYS
SUM OF ALL RESPONSES TO PHQ9 QUESTIONS 1 AND 2: 1
2. FEELING DOWN, DEPRESSED OR HOPELESS: NOT AT ALL
SUM OF ALL RESPONSES TO PHQ9 QUESTIONS 1 AND 2: 0

## 2023-04-14 ASSESSMENT — ANXIETY QUESTIONNAIRES
3. WORRYING TOO MUCH ABOUT DIFFERENT THINGS: NOT AT ALL
4. TROUBLE RELAXING: SEVERAL DAYS
2. NOT BEING ABLE TO STOP OR CONTROL WORRYING: NOT AT ALL
6. BECOMING EASILY ANNOYED OR IRRITABLE: NOT AT ALL
5. BEING SO RESTLESS THAT IT IS HARD TO SIT STILL: NOT AT ALL
1. FEELING NERVOUS, ANXIOUS, OR ON EDGE: SEVERAL DAYS
GAD7 TOTAL SCORE: 2
7. FEELING AFRAID AS IF SOMETHING AWFUL MIGHT HAPPEN: NOT AT ALL

## 2023-04-14 NOTE — PATIENT INSTRUCTIONS
Stop lisinopril  Start losartan  We will message you in 2wks to get bp     Continue fluoxetine    Return in 6mon for follow up      I will communicate with you via [employer] regarding messages and results. If you need help with this, you can call the support line at [phone].    IT WAS A PLEASURE TO SEE YOU TODAY. THANK YOU FOR CHOOSING US FOR YOUR HEALTHCARE NEEDS.

## 2023-05-04 DIAGNOSIS — I10 BENIGN ESSENTIAL HYPERTENSION: Primary | ICD-10-CM

## 2023-05-04 RX ORDER — LOSARTAN POTASSIUM 100 MG/1
100 TABLET ORAL DAILY
Qty: 30 TABLET | Refills: 1 | Status: SHIPPED | OUTPATIENT
Start: 2023-05-04 | End: 2023-05-30

## 2023-05-27 DIAGNOSIS — I10 BENIGN ESSENTIAL HYPERTENSION: ICD-10-CM

## 2023-05-30 ENCOUNTER — TELEPHONE (OUTPATIENT)
Dept: PRIMARY CARE | Facility: CLINIC | Age: 38
End: 2023-05-30
Payer: COMMERCIAL

## 2023-05-30 RX ORDER — LOSARTAN POTASSIUM 100 MG/1
TABLET ORAL
Qty: 30 TABLET | Refills: 5 | Status: SHIPPED | OUTPATIENT
Start: 2023-05-30 | End: 2023-11-27

## 2023-05-31 DIAGNOSIS — F41.9 ANXIETY: Primary | ICD-10-CM

## 2023-05-31 RX ORDER — SERTRALINE HYDROCHLORIDE 50 MG/1
TABLET, FILM COATED ORAL
Qty: 30 TABLET | Refills: 5 | Status: SHIPPED | OUTPATIENT
Start: 2023-05-31 | End: 2023-06-22

## 2023-06-22 DIAGNOSIS — F41.9 ANXIETY: ICD-10-CM

## 2023-06-22 RX ORDER — SERTRALINE HYDROCHLORIDE 50 MG/1
TABLET, FILM COATED ORAL
Qty: 90 TABLET | Refills: 2 | Status: SHIPPED | OUTPATIENT
Start: 2023-06-22 | End: 2024-04-17 | Stop reason: ALTCHOICE

## 2023-07-05 ASSESSMENT — ENCOUNTER SYMPTOMS
SHORTNESS OF BREATH: 0
CONSTITUTIONAL NEGATIVE: 1
WHEEZING: 0

## 2023-07-05 NOTE — PROGRESS NOTES
Subjective   Patient ID: Jeanne Myers is a 38 y.o. female who presents for Follow-up (Pt is here for F/U medication /Bps at home- yes/Last use of xanax- 2 month /Is pt fasting?  no/Did she do the fasting labs from march? No /).  Last physical: 3/24/23  Bps at home-yes 120s/80s  Last use of xanax-2mon ago  Is pt fasting? no  Did she do the fasting labs from march? no    Phq9, gad7    HPI  On lisinopril 10mg a day-made her cough on off, even at night  Started losartan 50mg 4/14/23 and incr losartan to 100mg on 5/4/23    Seen 3/24/23 and 4/14/23   Stopped fluoxetine 5/30/23 due to body tension and started sertraline 25mg daily and now is on 50mg daily  Wt gain, fatigue and not helping anxiety and depression as well as the fluoxetine    3/24/23 Phq9=11, 4/14/23= 5, today=13  3/24/23 gad7=13, 4/14/23=3, today=8    Xpmxniwz-jikyqhbtn-tajf,peloton, rower  Breakfast-coffee or latte, pb Total [employer]ox sandwich cookie  Lunch-bad recently, water with flavoring  Dinner-bad recently, water  Etoh rare    No new stresses  No meds in the past for anxiety  Therapist >10yrs ago  No psychiatrist  Fh mental illness: none    Can process things despite anxiety  Better attitude  Easier to manage thruout the day     Can fall asleep  Off diet  Wants to restart exercise  No suicidal thoughts or plans     OARRS:  No data recorded  I have personally reviewed the OARRS report for Xiomara Myers. I have considered the risks of abuse, dependence, addiction and diversion     Is the patient prescribed a combination of a benzodiazepine and opioid?  No     Last Urine Drug Screen / ordered today: 3/24/23  Csa 3/24/23         no delusions, hallucinations, uncontrollable/purposeless mvmts, or fixed/inflexible posture  no extreme mood swings that include emotional highs and lows,      Abnormally upbeat, jumpy or wired,      Increased activity, energy or agitation,      Exaggerated sense of well-being and self-confidence (euphoria),      Irritable,       Decreased need for sleep,      Unusual talkativeness,      Racing thoughts,      Distractibility,      Poor decision-making - for example, going on buying sprees, taking sexual risks or making foolish investments           affects sleep, energy, activity, judgment, behavior and the ability to think clearly.          No care team member to display     Review of Systems   Constitutional: Negative.    Respiratory:  Negative for shortness of breath and wheezing.    Cardiovascular:  Negative for chest pain.       Objective   Visit Vitals  BP (!) 151/96   Pulse 86   Temp 36.2 °C (97.1 °F)      BP Readings from Last 3 Encounters:   07/06/23 (!) 151/96   04/14/23 126/74   03/24/23 138/68     Wt Readings from Last 3 Encounters:   07/06/23 121 kg (267 lb)   04/14/23 117 kg (259 lb)   03/24/23 117 kg (258 lb 9.6 oz)       Physical Exam  Constitutional:       General: She is not in acute distress.     Appearance: Normal appearance.   Neurological:      Mental Status: She is alert.         Assessment/Plan     1. Anxiety  Stop sertraline  Start lexapro    2. Benign essential hypertension      3. Mixed hyperlipidemia      4. BMI 40.0-44.9, adult (CMS/HCC)      5. Morbid obesity (CMS/HCC)

## 2023-07-06 ENCOUNTER — OFFICE VISIT (OUTPATIENT)
Dept: PRIMARY CARE | Facility: CLINIC | Age: 38
End: 2023-07-06
Payer: COMMERCIAL

## 2023-07-06 VITALS
OXYGEN SATURATION: 95 % | DIASTOLIC BLOOD PRESSURE: 91 MMHG | TEMPERATURE: 97.1 F | HEIGHT: 68 IN | SYSTOLIC BLOOD PRESSURE: 147 MMHG | WEIGHT: 267 LBS | BODY MASS INDEX: 40.47 KG/M2 | HEART RATE: 86 BPM

## 2023-07-06 DIAGNOSIS — E78.2 MIXED HYPERLIPIDEMIA: ICD-10-CM

## 2023-07-06 DIAGNOSIS — F41.9 ANXIETY: Primary | ICD-10-CM

## 2023-07-06 DIAGNOSIS — I10 BENIGN ESSENTIAL HYPERTENSION: ICD-10-CM

## 2023-07-06 DIAGNOSIS — E66.01 MORBID OBESITY (MULTI): ICD-10-CM

## 2023-07-06 PROCEDURE — 3080F DIAST BP >= 90 MM HG: CPT | Performed by: NURSE PRACTITIONER

## 2023-07-06 PROCEDURE — 3008F BODY MASS INDEX DOCD: CPT | Performed by: NURSE PRACTITIONER

## 2023-07-06 PROCEDURE — 3077F SYST BP >= 140 MM HG: CPT | Performed by: NURSE PRACTITIONER

## 2023-07-06 PROCEDURE — 99214 OFFICE O/P EST MOD 30 MIN: CPT | Performed by: NURSE PRACTITIONER

## 2023-07-06 PROCEDURE — 1036F TOBACCO NON-USER: CPT | Performed by: NURSE PRACTITIONER

## 2023-07-06 RX ORDER — CALCIUM ACETATE 668 MG
500 TABLET ORAL
COMMUNITY

## 2023-07-06 RX ORDER — ESCITALOPRAM OXALATE 5 MG/1
5 TABLET ORAL DAILY
Qty: 30 TABLET | Refills: 1 | Status: SHIPPED | OUTPATIENT
Start: 2023-07-06 | End: 2023-07-28

## 2023-07-06 RX ORDER — GLUCOSAM/CHONDRO/HERB 149/HYAL 750-100 MG
TABLET ORAL
COMMUNITY
End: 2024-04-19 | Stop reason: SDUPTHER

## 2023-07-06 ASSESSMENT — PATIENT HEALTH QUESTIONNAIRE - PHQ9
SUM OF ALL RESPONSES TO PHQ9 QUESTIONS 1 AND 2: 3
7. TROUBLE CONCENTRATING ON THINGS, SUCH AS READING THE NEWSPAPER OR WATCHING TELEVISION: MORE THAN HALF THE DAYS
8. MOVING OR SPEAKING SO SLOWLY THAT OTHER PEOPLE COULD HAVE NOTICED. OR THE OPPOSITE, BEING SO FIGETY OR RESTLESS THAT YOU HAVE BEEN MOVING AROUND A LOT MORE THAN USUAL: NOT AT ALL
9. THOUGHTS THAT YOU WOULD BE BETTER OFF DEAD, OR OF HURTING YOURSELF: NOT AT ALL
3. TROUBLE FALLING OR STAYING ASLEEP OR SLEEPING TOO MUCH: NEARLY EVERY DAY
5. POOR APPETITE OR OVEREATING: MORE THAN HALF THE DAYS
4. FEELING TIRED OR HAVING LITTLE ENERGY: MORE THAN HALF THE DAYS
10. IF YOU CHECKED OFF ANY PROBLEMS, HOW DIFFICULT HAVE THESE PROBLEMS MADE IT FOR YOU TO DO YOUR WORK, TAKE CARE OF THINGS AT HOME, OR GET ALONG WITH OTHER PEOPLE: SOMEWHAT DIFFICULT
1. LITTLE INTEREST OR PLEASURE IN DOING THINGS: MORE THAN HALF THE DAYS
2. FEELING DOWN, DEPRESSED OR HOPELESS: SEVERAL DAYS
SUM OF ALL RESPONSES TO PHQ QUESTIONS 1-9: 13
6. FEELING BAD ABOUT YOURSELF - OR THAT YOU ARE A FAILURE OR HAVE LET YOURSELF OR YOUR FAMILY DOWN: SEVERAL DAYS

## 2023-07-06 ASSESSMENT — ANXIETY QUESTIONNAIRES
3. WORRYING TOO MUCH ABOUT DIFFERENT THINGS: SEVERAL DAYS
GAD7 TOTAL SCORE: 8
7. FEELING AFRAID AS IF SOMETHING AWFUL MIGHT HAPPEN: SEVERAL DAYS
4. TROUBLE RELAXING: SEVERAL DAYS
6. BECOMING EASILY ANNOYED OR IRRITABLE: SEVERAL DAYS
5. BEING SO RESTLESS THAT IT IS HARD TO SIT STILL: MORE THAN HALF THE DAYS
IF YOU CHECKED OFF ANY PROBLEMS ON THIS QUESTIONNAIRE, HOW DIFFICULT HAVE THESE PROBLEMS MADE IT FOR YOU TO DO YOUR WORK, TAKE CARE OF THINGS AT HOME, OR GET ALONG WITH OTHER PEOPLE: SOMEWHAT DIFFICULT
2. NOT BEING ABLE TO STOP OR CONTROL WORRYING: SEVERAL DAYS
1. FEELING NERVOUS, ANXIOUS, OR ON EDGE: SEVERAL DAYS

## 2023-07-06 NOTE — PATIENT INSTRUCTIONS
Stop sertraline  Start lexapro  We will message you in 2wks    [id number] jonn  Decr carbs and sugars  Continue exercise    You can use the lab in our building when fasting. The hrs are: Monday-Thursday, 7 a.m. - 6 p.m., Friday, 7 a.m. - 5 p.m., Saturday 8 a.m. - 12 noon.   No appt needed, BUT YOU DO NEED THE PAPER ORDER.    Fasting is no food, drink, gum or mints other than water for 8-12 hrs.   Results will be back in 2-3 business days for most labs. It is always recommended for any orders (labs, xrays, ultrasounds,MRI, ct scan, procedures etc) to check with your insurance provider for expected costs or expenses to you.       Check bp 2 times a wk  Goal <140/<90  Let me know if bp consistently higher than this    Return in march for wellness appt  I will let you know if I need to see you sooner      I will communicate with you via [employer] regarding messages and results. If you need help with this, you can call the support line at [phone].    IT WAS A PLEASURE TO SEE YOU TODAY. THANK YOU FOR CHOOSING US FOR YOUR HEALTHCARE NEEDS.

## 2023-07-28 DIAGNOSIS — F41.9 ANXIETY: ICD-10-CM

## 2023-07-28 RX ORDER — ESCITALOPRAM OXALATE 5 MG/1
5 TABLET ORAL DAILY
Qty: 90 TABLET | Refills: 2 | Status: SHIPPED | OUTPATIENT
Start: 2023-07-28 | End: 2024-04-18 | Stop reason: ALTCHOICE

## 2023-11-24 DIAGNOSIS — I10 BENIGN ESSENTIAL HYPERTENSION: ICD-10-CM

## 2023-11-27 ENCOUNTER — TELEPHONE (OUTPATIENT)
Dept: PRIMARY CARE | Facility: CLINIC | Age: 38
End: 2023-11-27
Payer: COMMERCIAL

## 2023-11-27 RX ORDER — LOSARTAN POTASSIUM 100 MG/1
TABLET ORAL
Qty: 90 TABLET | Refills: 2 | Status: SHIPPED | OUTPATIENT
Start: 2023-11-27

## 2024-03-22 ENCOUNTER — APPOINTMENT (OUTPATIENT)
Dept: PRIMARY CARE | Facility: CLINIC | Age: 39
End: 2024-03-22
Payer: COMMERCIAL

## 2024-04-17 ASSESSMENT — PROMIS GLOBAL HEALTH SCALE
CARRYOUT_SOCIAL_ACTIVITIES: FAIR
RATE_SOCIAL_SATISFACTION: FAIR
RATE_PHYSICAL_HEALTH: FAIR
RATE_QUALITY_OF_LIFE: GOOD
RATE_AVERAGE_PAIN: 0
RATE_GENERAL_HEALTH: GOOD
RATE_AVERAGE_FATIGUE: MILD
CARRYOUT_PHYSICAL_ACTIVITIES: COMPLETELY
RATE_MENTAL_HEALTH: FAIR
EMOTIONAL_PROBLEMS: OFTEN

## 2024-04-17 ASSESSMENT — ENCOUNTER SYMPTOMS
DIZZINESS: 0
NECK PAIN: 0
VOMITING: 0
HEMATURIA: 0
HALLUCINATIONS: 0
PALPITATIONS: 0
POLYDIPSIA: 0
BLOOD IN STOOL: 0
COUGH: 0
CONFUSION: 0
FATIGUE: 0
HEADACHES: 0
ADENOPATHY: 0
EYE DISCHARGE: 0
UNEXPECTED WEIGHT CHANGE: 0
WOUND: 0
TROUBLE SWALLOWING: 0
BRUISES/BLEEDS EASILY: 0
FREQUENCY: 0
ABDOMINAL PAIN: 0
APPETITE CHANGE: 0
BACK PAIN: 0
EYE PAIN: 0
CHILLS: 0
SORE THROAT: 0
POLYPHAGIA: 0
SHORTNESS OF BREATH: 0
FEVER: 0
EYE REDNESS: 0
DYSURIA: 0

## 2024-04-17 NOTE — PROGRESS NOTES
Subjective   Patient ID: Jeanne Myers is a 38 y.o. female who presents for Annual Exam.      Is pt fasting?  Yes   Did pt do fasting labs from 1yr ago? No   Does pt see any providers other than me?  No     Does pt want pap today?  No   Does pt want me to refer her to ob gyn? Not today   Bps at home - not taking but when at [facility] for ear infection  it was 140 to 150 over 90   Is pt taking fish oil 2 bid? Yes   Did pt do mammogram from last yr? No   If pt will need a refill of xanax in the next 6mon, do csa and uds yes   Last xanax use- today   Any concerns pt has today? A daily anxiety medication, weight loss, cough that has been on going, would like a note for a standing desk at work.     Phq9=10   ,gad7=12      No care team member to display    HPI  Last labs-2022 ldl 102, hdl 41, trigs 245; 2021 tsh, cbc, cmp-all nl  Due for labs- all    Stresses-family member in hospital, grandma   Fluoxetine-body tension  Sertraline-wt gain, fatigue  Lexapro-heavy period  Therapist >10yrs ago  No psychiatrist  Fh mental illness: none  No suicidal thoughts or plans    OARRS:  Preeti Garza, JANICE-CNP on 2024  8:10 AM  I have personally reviewed the OARRS report for Xiomara Myers. I have considered the risks of abuse, dependence, addiction and diversion    Is the patient prescribed a combination of a benzodiazepine and opioid?  No    Last Urine Drug Screen / ordered today:   No results found for this or any previous visit (from the past 8760 hour(s)).    N/A    Controlled Substance Agreement:  Date of the Last Agreement:   Reviewed Controlled Substance Agreement including but not limited to the benefits, risks, and alternatives to treatment with a Controlled Substance medication(s).    Benzodiazepines:  What is the patient's goal of therapy? Decr anxiety  Is this being achieved with current treatment? no    STEFAN-7:  No data recorded    Activities of Daily Living:   Is your overall impression that this patient  is benefiting (symptom reduction outweighs side effects) from benzodiazepine therapy? Yes but only temporary, starting pt on daily med-fluoxetine    1. Physical Functioning: Same  2. Family Relationship: Same  3. Social Relationship: Same  4. Mood: Same  5. Sleep Patterns: Same  6. Overall Function: Same    Cholesterol   Date Value Ref Range Status   02/16/2022 192 0 - 199 mg/dL Final     Comment:     .      AGE      DESIRABLE   BORDERLINE HIGH   HIGH     0-19 Y     0 - 169       170 - 199     >/= 200    20-24 Y     0 - 189       190 - 224     >/= 225         >24 Y     0 - 199       200 - 239     >/= 240   **All ranges are based on fasting samples. Specific   therapeutic targets will vary based on patient-specific   cardiac risk.  .   Pediatric guidelines reference:Pediatrics 2011, 128(S5).   Adult guidelines reference: NCEP ATPIII Guidelines,     PAO 2001, 258:2486-97  .   Venipuncture immediately after or during the    administration of Metamizole may lead to falsely   low results. Testing should be performed immediately   prior to Metamizole dosing.     09/03/2021 204 (H) 0 - 199 mg/dL Final     Comment:     .      AGE      DESIRABLE   BORDERLINE HIGH   HIGH     0-19 Y     0 - 169       170 - 199     >/= 200    20-24 Y     0 - 189       190 - 224     >/= 225         >24 Y     0 - 199       200 - 239     >/= 240   **All ranges are based on fasting samples. Specific   therapeutic targets will vary based on patient-specific   cardiac risk.  .   Pediatric guidelines reference:Pediatrics 2011, 128(S5).   Adult guidelines reference: NCEP ATPIII Guidelines,     PAO 2001, 258:2486-97  .   Venipuncture immediately after or during the    administration of Metamizole may lead to falsely   low results. Testing should be performed immediately   prior to Metamizole dosing.       Triglycerides   Date Value Ref Range Status   02/16/2022 245 (H) 0 - 149 mg/dL Final     Comment:     .      AGE      DESIRABLE   BORDERLINE HIGH    "HIGH     VERY HIGH   0 D-90 D    19 - 174         ----         ----        ----  91 D- 9 Y     0 -  74        75 -  99     >/= 100      ----    10-19 Y     0 -  89        90 - 129     >/= 130      ----    20-24 Y     0 - 114       115 - 149     >/= 150      ----         >24 Y     0 - 149       150 - 199    200- 499    >/= 500  .   Venipuncture immediately after or during the    administration of Metamizole may lead to falsely   low results. Testing should be performed immediately   prior to Metamizole dosing.     09/03/2021 303 (H) 0 - 149 mg/dL Final     Comment:     .      AGE      DESIRABLE   BORDERLINE HIGH   HIGH     VERY HIGH   0 D-90 D    19 - 174         ----         ----        ----  91 D- 9 Y     0 -  74        75 -  99     >/= 100      ----    10-19 Y     0 -  89        90 - 129     >/= 130      ----    20-24 Y     0 - 114       115 - 149     >/= 150      ----         >24 Y     0 - 149       150 - 199    200- 499    >/= 500  .   Venipuncture immediately after or during the    administration of Metamizole may lead to falsely   low results. Testing should be performed immediately   prior to Metamizole dosing.       HDL   Date Value Ref Range Status   02/16/2022 41.3 mg/dL Final     Comment:     .      AGE      VERY LOW   LOW     NORMAL    HIGH       0-19 Y       < 35   < 40     40-45     ----    20-24 Y       ----   < 40       >45     ----      >24 Y       ----   < 40     40-60      >60  .     09/03/2021 40.5 mg/dL Final     Comment:     .      AGE      VERY LOW   LOW     NORMAL    HIGH       0-19 Y       < 35   < 40     40-45     ----    20-24 Y       ----   < 40       >45     ----      >24 Y       ----   < 40     40-60      >60  .       No results found for: \"LDL\"  TSH   Date Value Ref Range Status   09/03/2021 2.57 0.44 - 3.98 mIU/L Final     Comment:      TSH testing is performed using different testing    methodology at [facility] than at other    [facility]. Direct result " "comparisons should    only be made within the same method.       No results found for: \"A1C\"  No components found for: \"POCA1C\"  No results found for: \"ALBUR\"  No components found for: \"POCALBUR\"      Other concerns:  Wants to lose wt    Needs letter for standing desk at work-trying to get more mvmt    cough that has been on going on off since bronchitis with only a little mucus  Worse last wk  Talking and laughing cause cough jags  Worse in evening  Not keep pt up  Wheezing last night  No sob  No fever or chills  Post nasal drip noted    bps at home- no    ER/urgicare visits in the last year- mid march ear pain; bronchitis  Hospitalizations in the last year- none      last Pap- never  H/o abn pap-n/a  Frequency-longer 45d  Duration-4-6d  Heavy periods-lighter lately with higher wt  Abn uterine bleeding-none  Dysmenorrhea-none  FH ovarian, endometrial, cervical, uterine ca-none    Current birth control method-none  No change in contraception desired      Self breast exams-occas    FH br ca-gr aunt pat      FH colon ca-none      Exercise- not x 1mon-spinning and wts  Mid march ear infection  Bronchitis-unsure name of abx  Now has cough  Diet-off 1-2mon; 2meals a day plus snacks  Body mass index is 42.27 kg/m².    last eye dr appt- glasses; 1 1/2 yrs ago  No vision issues    last dental appt- q6mon    BMs-regular  Sleep-able to fall asleep and  stay asleep; no snoring or apnea; trazodone if not working caused anxiety so stopped taking it.  no cp, swelling, sob, abd pain, n/v/d/c, blood in stool or black stools  STI testing including hiv (age 15-65) and hep c screening (18-79)-declines        Immunization History   Administered Date(s) Administered    Influenza, Injectable, MDCK, preservative free 10/30/2023    Influenza, seasonal, injectable 09/24/2021    Moderna SARS-CoV-2 Vaccination 04/14/2021, 05/15/2021, 01/10/2022    Tdap vaccine, age 7 year and older (BOOSTRIX, ADACEL) 05/22/2014       fractures in " lifetime-finger, toe, arm  FH osteoporosis-none    FH heart attack, heart surgery-pgm,pgf, dad-mi  FH stroke-none  Ct cardiac score 3/2022=0    The ASCVD Risk score (Fozia DK, et al., 2019) failed to calculate for the following reasons:    The 2019 ASCVD risk score is only valid for ages 40 to 79        Review of Systems   Constitutional:  Negative for appetite change, chills, fatigue, fever and unexpected weight change.   HENT:  Negative for congestion, ear pain, sore throat and trouble swallowing.    Eyes:  Negative for pain, discharge and redness.   Respiratory:  Negative for cough and shortness of breath.    Cardiovascular:  Negative for chest pain and palpitations.   Gastrointestinal:  Negative for abdominal pain, blood in stool and vomiting.   Endocrine: Negative for polydipsia, polyphagia and polyuria.   Genitourinary:  Negative for dysuria, frequency, hematuria and urgency.   Musculoskeletal:  Negative for back pain and neck pain.   Skin:  Negative for rash and wound.   Allergic/Immunologic: Negative for immunocompromised state.   Neurological:  Negative for dizziness, syncope and headaches.   Hematological:  Negative for adenopathy. Does not bruise/bleed easily.   Psychiatric/Behavioral:  Negative for confusion and hallucinations.        Objective   Visit Vitals  BP (!) 147/95   Pulse 106   Temp 36.4 °C (97.5 °F)        BP Readings from Last 3 Encounters:   04/18/24 (!) 147/95   07/06/23 (!) 147/91   04/14/23 126/74     Wt Readings from Last 3 Encounters:   04/18/24 126 kg (278 lb)   07/06/23 121 kg (267 lb)   04/14/23 117 kg (259 lb)           Physical Exam  Constitutional:       General: She is not in acute distress.     Appearance: Normal appearance. She is not ill-appearing.   HENT:      Head: Normocephalic.      Right Ear: Tympanic membrane, ear canal and external ear normal.      Left Ear: Tympanic membrane, ear canal and external ear normal.      Nose: Nose normal.      Mouth/Throat:      Mouth:  Mucous membranes are moist.      Pharynx: Oropharynx is clear.   Eyes:      Extraocular Movements: Extraocular movements intact.      Conjunctiva/sclera: Conjunctivae normal.      Pupils: Pupils are equal, round, and reactive to light.   Cardiovascular:      Rate and Rhythm: Normal rate and regular rhythm.      Heart sounds: Normal heart sounds. No murmur heard.  Pulmonary:      Effort: Pulmonary effort is normal. No respiratory distress.      Breath sounds: Normal breath sounds. No wheezing, rhonchi or rales.   Abdominal:      General: Bowel sounds are normal.      Palpations: Abdomen is soft. There is no mass.      Tenderness: There is no abdominal tenderness.   Musculoskeletal:         General: No swelling or tenderness. Normal range of motion.      Cervical back: Normal range of motion and neck supple.      Right lower leg: No edema.      Left lower leg: No edema.   Skin:     General: Skin is warm.      Findings: No rash.   Neurological:      General: No focal deficit present.      Mental Status: She is alert and oriented to person, place, and time.      Cranial Nerves: No cranial nerve deficit.      Motor: No weakness.   Psychiatric:         Mood and Affect: Mood normal.         Behavior: Behavior normal.       Assessment/Plan   Diagnoses and all orders for this visit:  Routine general medical examination at a health care facility  -     Comprehensive Metabolic Panel; Future  -     CBC and Auto Differential; Future  -     Lipid Panel; Future  -     TSH with reflex to Free T4 if abnormal; Future  Encounter for screening mammogram for malignant neoplasm of breast  -     BI mammo bilateral screening tomosynthesis; Future  Medication management  -     Opiate/Opioid/Benzo Prescription Compliance; Future  Anxiety  -     ALPRAZolam (Xanax) 0.25 mg tablet; Take 1 tablet (0.25 mg) by mouth 3 times a day as needed for anxiety.  -     DULoxetine (Cymbalta) 30 mg DR capsule; Take 1 capsule (30 mg) by mouth once daily for 7  days. Do not crush or chew.  -     DULoxetine (Cymbalta) 60 mg DR capsule; Take 1 capsule (60 mg) by mouth once daily. Do not crush or chew.  -     Referral to Psychology; Future  Bronchitis  -     doxycycline (Vibramycin) 100 mg capsule; Take 1 capsule (100 mg) by mouth 2 times a day for 10 days. Take with at least 8 ounces (large glass) of water, do not lie down for 30 minutes after  Bronchospasm  -     fluticasone propion-salmeteroL (Advair Diskus) 250-50 mcg/dose diskus inhaler; Inhale 1 puff 2 times a day. Rinse mouth with water after use to reduce aftertaste and incidence of candidiasis. Do not swallow.  BMI 40.0-44.9, adult (Multi)  -     Referral to Psychology; Future  Morbid obesity (Multi)  -     Referral to Psychology; Future  Other orders  -     Tdap vaccine, age 7 years and older  -     Follow Up In Primary Care - Established; Future

## 2024-04-18 ENCOUNTER — HOSPITAL ENCOUNTER (OUTPATIENT)
Dept: RADIOLOGY | Facility: EXTERNAL LOCATION | Age: 39
Discharge: HOME | End: 2024-04-18

## 2024-04-18 ENCOUNTER — OFFICE VISIT (OUTPATIENT)
Dept: PRIMARY CARE | Facility: CLINIC | Age: 39
End: 2024-04-18
Payer: COMMERCIAL

## 2024-04-18 ENCOUNTER — PATIENT MESSAGE (OUTPATIENT)
Dept: PRIMARY CARE | Facility: CLINIC | Age: 39
End: 2024-04-18

## 2024-04-18 VITALS
TEMPERATURE: 97.5 F | DIASTOLIC BLOOD PRESSURE: 95 MMHG | SYSTOLIC BLOOD PRESSURE: 147 MMHG | OXYGEN SATURATION: 95 % | BODY MASS INDEX: 42.13 KG/M2 | WEIGHT: 278 LBS | HEART RATE: 106 BPM | HEIGHT: 68 IN

## 2024-04-18 DIAGNOSIS — Z00.00 ROUTINE GENERAL MEDICAL EXAMINATION AT A HEALTH CARE FACILITY: Primary | ICD-10-CM

## 2024-04-18 DIAGNOSIS — J98.01 BRONCHOSPASM: ICD-10-CM

## 2024-04-18 DIAGNOSIS — Z12.31 ENCOUNTER FOR SCREENING MAMMOGRAM FOR MALIGNANT NEOPLASM OF BREAST: ICD-10-CM

## 2024-04-18 DIAGNOSIS — E78.2 MIXED HYPERLIPIDEMIA: Primary | ICD-10-CM

## 2024-04-18 DIAGNOSIS — E66.01 MORBID OBESITY (MULTI): ICD-10-CM

## 2024-04-18 DIAGNOSIS — J98.01 BRONCHOSPASM: Primary | ICD-10-CM

## 2024-04-18 DIAGNOSIS — F41.9 ANXIETY: ICD-10-CM

## 2024-04-18 DIAGNOSIS — Z79.899 MEDICATION MANAGEMENT: ICD-10-CM

## 2024-04-18 DIAGNOSIS — J40 BRONCHITIS: ICD-10-CM

## 2024-04-18 LAB
NON-UH HIE A/G RATIO: 1.3
NON-UH HIE ALB: 4.2 G/DL (ref 3.4–5)
NON-UH HIE ALK PHOS: 102 UNIT/L (ref 45–117)
NON-UH HIE BASO COUNT: 0.04 X1000 (ref 0–0.2)
NON-UH HIE BASOS %: 0.7 %
NON-UH HIE BILIRUBIN, TOTAL: 0.6 MG/DL (ref 0.3–1.2)
NON-UH HIE BUN/CREAT RATIO: 7.8
NON-UH HIE BUN: 7 MG/DL (ref 9–23)
NON-UH HIE CALCIUM: 9.9 MG/DL (ref 8.7–10.4)
NON-UH HIE CALCULATED LDL CHOLESTEROL: ABNORMAL MG/DL (ref 60–130)
NON-UH HIE CALCULATED OSMOLALITY: 274 MOSM/KG (ref 275–295)
NON-UH HIE CHLORIDE: 105 MMOL/L (ref 98–107)
NON-UH HIE CHOLESTEROL: 260 MG/DL (ref 100–200)
NON-UH HIE CO2, VENOUS: 26 MMOL/L (ref 20–31)
NON-UH HIE CREATININE: 0.9 MG/DL (ref 0.5–0.8)
NON-UH HIE DIFF?: NO
NON-UH HIE DIRECT LDL: 139 MG/DL
NON-UH HIE EOS COUNT: 0.24 X1000 (ref 0–0.5)
NON-UH HIE EOSIN %: 3.8 %
NON-UH HIE GFR AA: >60
NON-UH HIE GLOBULIN: 3.3 G/DL
NON-UH HIE GLOMERULAR FILTRATION RATE: >60 ML/MIN/1.73M?
NON-UH HIE GLUCOSE: 95 MG/DL (ref 74–106)
NON-UH HIE GOT: 33 UNIT/L (ref 15–37)
NON-UH HIE GPT: 48 UNIT/L (ref 10–49)
NON-UH HIE HCT: 41.5 % (ref 36–46)
NON-UH HIE HDL CHOLESTEROL: 37 MG/DL (ref 40–60)
NON-UH HIE HGB: 14 G/DL (ref 12–16)
NON-UH HIE INSTR WBC: 6.3
NON-UH HIE K: 4 MMOL/L (ref 3.5–5.1)
NON-UH HIE LYMPH %: 34.6 %
NON-UH HIE LYMPH COUNT: 2.2 X1000 (ref 1.2–4.8)
NON-UH HIE MCH: 27.2 PG (ref 27–34)
NON-UH HIE MCHC: 33.7 G/DL (ref 32–37)
NON-UH HIE MCV: 80.7 FL (ref 80–100)
NON-UH HIE MONO %: 5 %
NON-UH HIE MONO COUNT: 0.32 X1000 (ref 0.1–1)
NON-UH HIE MPV: 8.3 FL (ref 7.4–10.4)
NON-UH HIE NA: 138 MMOL/L (ref 135–145)
NON-UH HIE NEUTROPHIL %: 56 %
NON-UH HIE NEUTROPHIL COUNT (ANC): 3.55 X1000 (ref 1.4–8.8)
NON-UH HIE NUCLEATED RBC: 0 /100WBC
NON-UH HIE PLATELET: 216 X10 (ref 150–450)
NON-UH HIE RBC: 5.14 X10 (ref 4.2–5.4)
NON-UH HIE RDW: 15.2 % (ref 11.5–14.5)
NON-UH HIE TOTAL CHOL/HDL CHOL RATIO: 7
NON-UH HIE TOTAL PROTEIN: 7.5 G/DL (ref 5.7–8.2)
NON-UH HIE TRIGLYCERIDES: 488 MG/DL (ref 30–150)
NON-UH HIE TSH: 2.61 UIU/ML (ref 0.55–4.78)
NON-UH HIE WBC: 6.3 X10 (ref 4.5–11)

## 2024-04-18 PROCEDURE — 80373 DRUG SCREENING TRAMADOL: CPT

## 2024-04-18 PROCEDURE — 1036F TOBACCO NON-USER: CPT | Performed by: NURSE PRACTITIONER

## 2024-04-18 PROCEDURE — 3077F SYST BP >= 140 MM HG: CPT | Performed by: NURSE PRACTITIONER

## 2024-04-18 PROCEDURE — 80354 DRUG SCREENING FENTANYL: CPT

## 2024-04-18 PROCEDURE — 3080F DIAST BP >= 90 MM HG: CPT | Performed by: NURSE PRACTITIONER

## 2024-04-18 PROCEDURE — 80346 BENZODIAZEPINES1-12: CPT

## 2024-04-18 PROCEDURE — 90715 TDAP VACCINE 7 YRS/> IM: CPT | Performed by: NURSE PRACTITIONER

## 2024-04-18 PROCEDURE — 80365 DRUG SCREENING OXYCODONE: CPT

## 2024-04-18 PROCEDURE — 90471 IMMUNIZATION ADMIN: CPT | Performed by: NURSE PRACTITIONER

## 2024-04-18 PROCEDURE — 80358 DRUG SCREENING METHADONE: CPT

## 2024-04-18 PROCEDURE — 99395 PREV VISIT EST AGE 18-39: CPT | Performed by: NURSE PRACTITIONER

## 2024-04-18 PROCEDURE — 80368 SEDATIVE HYPNOTICS: CPT

## 2024-04-18 PROCEDURE — 80361 OPIATES 1 OR MORE: CPT

## 2024-04-18 PROCEDURE — 99213 OFFICE O/P EST LOW 20 MIN: CPT | Performed by: NURSE PRACTITIONER

## 2024-04-18 PROCEDURE — 3008F BODY MASS INDEX DOCD: CPT | Performed by: NURSE PRACTITIONER

## 2024-04-18 RX ORDER — DOXYCYCLINE 100 MG/1
100 CAPSULE ORAL 2 TIMES DAILY
Qty: 20 CAPSULE | Refills: 0 | Status: SHIPPED | OUTPATIENT
Start: 2024-04-18 | End: 2024-04-28

## 2024-04-18 RX ORDER — ALPRAZOLAM 0.25 MG/1
0.25 TABLET ORAL 3 TIMES DAILY PRN
Qty: 30 TABLET | Refills: 1 | Status: SHIPPED | OUTPATIENT
Start: 2024-04-18

## 2024-04-18 RX ORDER — DULOXETIN HYDROCHLORIDE 30 MG/1
30 CAPSULE, DELAYED RELEASE ORAL DAILY
Qty: 7 CAPSULE | Refills: 0 | Status: SHIPPED | OUTPATIENT
Start: 2024-04-18 | End: 2024-05-10 | Stop reason: ALTCHOICE

## 2024-04-18 RX ORDER — FLUTICASONE PROPIONATE AND SALMETEROL 250; 50 UG/1; UG/1
1 POWDER RESPIRATORY (INHALATION)
Qty: 60 EACH | Refills: 1 | Status: SHIPPED | OUTPATIENT
Start: 2024-04-18 | End: 2024-05-21 | Stop reason: ALTCHOICE

## 2024-04-18 RX ORDER — FLUTICASONE PROPIONATE AND SALMETEROL 250; 50 UG/1; UG/1
1 POWDER RESPIRATORY (INHALATION)
Qty: 60 EACH | Refills: 1 | Status: SHIPPED | OUTPATIENT
Start: 2024-04-18 | End: 2024-04-18 | Stop reason: CLARIF

## 2024-04-18 RX ORDER — DULOXETIN HYDROCHLORIDE 60 MG/1
60 CAPSULE, DELAYED RELEASE ORAL DAILY
Qty: 30 CAPSULE | Refills: 1 | Status: SHIPPED | OUTPATIENT
Start: 2024-04-18 | End: 2024-05-10

## 2024-04-18 ASSESSMENT — ANXIETY QUESTIONNAIRES
GAD7 TOTAL SCORE: 12
5. BEING SO RESTLESS THAT IT IS HARD TO SIT STILL: MORE THAN HALF THE DAYS
3. WORRYING TOO MUCH ABOUT DIFFERENT THINGS: SEVERAL DAYS
IF YOU CHECKED OFF ANY PROBLEMS ON THIS QUESTIONNAIRE, HOW DIFFICULT HAVE THESE PROBLEMS MADE IT FOR YOU TO DO YOUR WORK, TAKE CARE OF THINGS AT HOME, OR GET ALONG WITH OTHER PEOPLE: SOMEWHAT DIFFICULT
7. FEELING AFRAID AS IF SOMETHING AWFUL MIGHT HAPPEN: NOT AT ALL
1. FEELING NERVOUS, ANXIOUS, OR ON EDGE: NEARLY EVERY DAY
2. NOT BEING ABLE TO STOP OR CONTROL WORRYING: MORE THAN HALF THE DAYS
4. TROUBLE RELAXING: MORE THAN HALF THE DAYS
6. BECOMING EASILY ANNOYED OR IRRITABLE: MORE THAN HALF THE DAYS

## 2024-04-18 ASSESSMENT — PATIENT HEALTH QUESTIONNAIRE - PHQ9
5. POOR APPETITE OR OVEREATING: MORE THAN HALF THE DAYS
SUM OF ALL RESPONSES TO PHQ9 QUESTIONS 1 AND 2: 2
10. IF YOU CHECKED OFF ANY PROBLEMS, HOW DIFFICULT HAVE THESE PROBLEMS MADE IT FOR YOU TO DO YOUR WORK, TAKE CARE OF THINGS AT HOME, OR GET ALONG WITH OTHER PEOPLE: SOMEWHAT DIFFICULT
6. FEELING BAD ABOUT YOURSELF - OR THAT YOU ARE A FAILURE OR HAVE LET YOURSELF OR YOUR FAMILY DOWN: MORE THAN HALF THE DAYS
1. LITTLE INTEREST OR PLEASURE IN DOING THINGS: SEVERAL DAYS
9. THOUGHTS THAT YOU WOULD BE BETTER OFF DEAD, OR OF HURTING YOURSELF: NOT AT ALL
8. MOVING OR SPEAKING SO SLOWLY THAT OTHER PEOPLE COULD HAVE NOTICED. OR THE OPPOSITE, BEING SO FIGETY OR RESTLESS THAT YOU HAVE BEEN MOVING AROUND A LOT MORE THAN USUAL: MORE THAN HALF THE DAYS
7. TROUBLE CONCENTRATING ON THINGS, SUCH AS READING THE NEWSPAPER OR WATCHING TELEVISION: MORE THAN HALF THE DAYS
3. TROUBLE FALLING OR STAYING ASLEEP OR SLEEPING TOO MUCH: NOT AT ALL
4. FEELING TIRED OR HAVING LITTLE ENERGY: NOT AT ALL
2. FEELING DOWN, DEPRESSED OR HOPELESS: SEVERAL DAYS
SUM OF ALL RESPONSES TO PHQ QUESTIONS 1-9: 10

## 2024-04-18 NOTE — PROGRESS NOTES
Sending a diff inhaler   Benzoyl Peroxide Pregnancy And Lactation Text: This medication is Pregnancy Category C. It is unknown if benzoyl peroxide is excreted in breast milk.

## 2024-04-18 NOTE — PATIENT INSTRUCTIONS
Refill xanax  Start cymbalta  Call if side effects or worsening symptoms  Follow up in 3wks.   See therapist    Your blood pressure is high at today's appointment.  Goal blood pressure is <140/<90.  Please check your blood pressure daily if you have a monitor at home or at a pharmacy or stop in here 2-3 times a week.  We will call or message you in 1 wk to obtain blood pressures.    To obtain accurate blood pressure reading:  Avoid eating, drinking alcohol or caffeinated beverages, smoking, exercising or bathing for 30 minutes prior to taking a measurement.   Urinate before taking the blood pressure and make sure you are sitting for 5 min before taking the blood pressure.   Both feet should be on the floor with back supported in a seat with a back to it with your arm at heart level like on a table.   The cuff should be on a bare arm.   No talking or movement when taking the blood pressure reading.   All these things will affect the accuracy of the reading.      Doxycycline  Advair inhaler-pharmacist can show you how to use it    Meds for weight loss:  Wegovy, saxenda, zepbound, qsymia, contrave-check pharmacy insurance for coverage.  These meds are not well covered even with a prior authorization.  Ozempic and trulicity and mounjaro are covered by your insurance with a diagnosis attached of diabetes. Without that diagnosis, it will not be covered.     If nothing is covered, you can consider:  Qsymia-$99 thru [facility]  OR  Contrave-$98 thru [facility]  These are pharmacies the  has contracted with to give a discounted price.      Please read this information sheet regarding QSYMIA and birth defects:  [email or web address]    Officially you should use a cervical cap or diaphragm with a condom  (fitted by an ob gyn)  OR  Combined estrogen and progesterone birth control pill, progesterone only pill or vaginal ring with a condom    to  prevent pregnancy.    If someone gets pregnant on this, their baby could have a birth defect-cleft lip.    I would also recommend no more than 2 alcohol drinks per day while on qsymia. It's ok to skip one day of qsymia while on the first 2 doses but not on the other doses.    Let me know which med you would like and which pharmacy to send it to.        Handouts given to pt:  physical handout        Labs:    You can use the lab in our building when fasting. The hrs are: Monday-Friday, 7 a.m. - 5 p.m., Saturday 8 a.m. - 12 noon.   No appt needed, BUT YOU DO NEED THE PAPER ORDER.    Fasting is no food, drink, gum or mints other than water for 12 hrs.   Results will be back in 2-3 business days for most labs. It is always recommended for any orders (labs, xrays, ultrasounds,MRI, ct scan, procedures etc) to check with your insurance provider for expected costs or expenses to you.     Screenings:    To set up mammogram, call [phone]    You will get your results via phone from my medical assistant if you do not have MyChart.  OR  You will get your results via [employer]    If a result is urgent, I will call to speak to you.    Vaccines:  Tdap today    General recommendations:  Exercise-cardio 4-5d/wk 30min each day  Diet-Breakfast-toast (my favorite Nichelle Alan Delighful Multigrain or Mann's Killer Bread Good Seed thin-sliced)/bagel/English muffin-whole wheat flour as a 1st ingredient or cereal/oatmeal/granola bar-fiber 4g or more or protein like eggs or peanut butter; optional veggies  Lunch-protein, 1/2c carb or 2 slices bread, veg 1c  Dinner-protein, fist sized carb, veg 1c  Fruit 2 a day  Dairy 2 a day-milk, soy milk, almond milk, cheese, yogurt, cottage cheese  Snacks-Protein-hard boiled egg, nuts (walnuts/almonds/pecans/pistachios 1/4c), hummus, beef/deer jerky or meat sticks; vegetable, fruit, dairy-milk(1%, skim, almond, soy)/cheese (not a lot of cheddar)/yogurt (Greek is best-my favorite Dannon Fruit on the Bottom  Greek)/cottage cheese 2%; triscuits/ popcorn/wheat thins have a lot of fiber; follow serving size on bag/box/container  increase water  Limit alcohol to 1 drink per day for women and 2 drinks per day for men (1 drink=12oz beer or 5oz wine or 1 1/2oz liquor)  Calcium: 500mg 1 twice a day if age 50 and younger and 600mg 1 twice a day if over age 50 (calcium citrate can be taken without food)  Vitamin D: 800-5000 IU/day  Limit salt to <2300mg a day if age 50 and under and <1500mg a day if over age 50/have high bp or diabetes or kidney disease  Recommend folate for childbearing age women 0.4mg per day (can be found in a multivitamin)  Recommend 18mg/dL of iron a day if age 50 and under and 8mg/dL a day if over age 50; take on an empty stomach at bedtime  Use sunscreen   Wear seatbelt  Recommend safe sex practices: using condoms everytime you have sex, discuss with a new partner about their past partners/history of STDs/drug use, avoid drinking alcohol or using drugs as this increases the chance that you will participate in high-risk sex, for oral sex help protect your mouth by having your partner use a condom (male or female), women should not douche after sex, be aware of your partner's body and your body-look for signs of a sore, blister, rash, or discharge, and have regular exams and periodic tests for STDs.  No distracted driving  No driving when under influence of substances  Wear a seatbelt  Eye dr every 1-2yrs  Dentist every 6-12 mon  Tetanus shot every 10yrs  Recommend flu vaccine in the fall  Appt in 3wks anxiety and 1 year for physical      I will communicate with you via [email or web address] regarding messages and results. If you need help with this, you can call the support line at [phone].    IT WAS A PLEASURE TO SEE YOU TODAY. THANK YOU FOR CHOOSING US FOR YOUR HEALTHCARE NEEDS.

## 2024-04-18 NOTE — LETTER
April 18, 2024     Patient: Xiomara Myers   YOB: 1985   Date of Visit: 4/18/2024       To Whom It May Concern:    Xiomara Myers will need a standing desk for work due to her medical conditions.    If you have any questions or concerns, please don't hesitate to call.         Sincerely,           Preeti Garza, APRN-CNP

## 2024-04-19 DIAGNOSIS — E78.2 MIXED HYPERLIPIDEMIA: Primary | ICD-10-CM

## 2024-04-19 RX ORDER — GLUCOSAM/CHONDRO/HERB 149/HYAL 750-100 MG
2 TABLET ORAL 2 TIMES DAILY
Qty: 120 CAPSULE | Refills: 11 | Status: SHIPPED | OUTPATIENT
Start: 2024-04-19

## 2024-04-22 DIAGNOSIS — L91.8 SKIN TAG: Primary | ICD-10-CM

## 2024-04-24 ENCOUNTER — HOSPITAL ENCOUNTER (OUTPATIENT)
Dept: RADIOLOGY | Facility: CLINIC | Age: 39
Discharge: HOME | End: 2024-04-24
Payer: COMMERCIAL

## 2024-04-24 DIAGNOSIS — J98.01 BRONCHOSPASM: ICD-10-CM

## 2024-04-24 DIAGNOSIS — J40 BRONCHITIS: Primary | ICD-10-CM

## 2024-04-24 DIAGNOSIS — J40 BRONCHITIS: ICD-10-CM

## 2024-04-24 PROCEDURE — 71046 X-RAY EXAM CHEST 2 VIEWS: CPT

## 2024-04-24 PROCEDURE — 71046 X-RAY EXAM CHEST 2 VIEWS: CPT | Performed by: RADIOLOGY

## 2024-04-25 ENCOUNTER — PATIENT MESSAGE (OUTPATIENT)
Dept: PRIMARY CARE | Facility: CLINIC | Age: 39
End: 2024-04-25
Payer: COMMERCIAL

## 2024-04-25 VITALS — DIASTOLIC BLOOD PRESSURE: 88 MMHG | SYSTOLIC BLOOD PRESSURE: 115 MMHG

## 2024-05-10 DIAGNOSIS — F41.9 ANXIETY: ICD-10-CM

## 2024-05-10 RX ORDER — DULOXETIN HYDROCHLORIDE 60 MG/1
60 CAPSULE, DELAYED RELEASE ORAL DAILY
Qty: 90 CAPSULE | Refills: 2 | Status: SHIPPED | OUTPATIENT
Start: 2024-05-10 | End: 2025-02-04

## 2024-05-16 ENCOUNTER — APPOINTMENT (OUTPATIENT)
Dept: PULMONOLOGY | Facility: CLINIC | Age: 39
End: 2024-05-16
Payer: COMMERCIAL

## 2024-05-21 ASSESSMENT — ENCOUNTER SYMPTOMS
CONSTITUTIONAL NEGATIVE: 1
WHEEZING: 0
SHORTNESS OF BREATH: 0

## 2024-05-22 ENCOUNTER — OFFICE VISIT (OUTPATIENT)
Dept: PRIMARY CARE | Facility: CLINIC | Age: 39
End: 2024-05-22
Payer: COMMERCIAL

## 2024-05-22 VITALS
OXYGEN SATURATION: 96 % | HEIGHT: 68 IN | TEMPERATURE: 97.3 F | WEIGHT: 272 LBS | BODY MASS INDEX: 41.22 KG/M2 | HEART RATE: 104 BPM | SYSTOLIC BLOOD PRESSURE: 128 MMHG | DIASTOLIC BLOOD PRESSURE: 87 MMHG

## 2024-05-22 DIAGNOSIS — E66.01 MORBID OBESITY (MULTI): ICD-10-CM

## 2024-05-22 DIAGNOSIS — E78.2 MIXED HYPERLIPIDEMIA: ICD-10-CM

## 2024-05-22 DIAGNOSIS — I10 BENIGN ESSENTIAL HYPERTENSION: ICD-10-CM

## 2024-05-22 DIAGNOSIS — J01.00 ACUTE NON-RECURRENT MAXILLARY SINUSITIS: ICD-10-CM

## 2024-05-22 DIAGNOSIS — F41.9 ANXIETY: ICD-10-CM

## 2024-05-22 DIAGNOSIS — F51.01 PRIMARY INSOMNIA: ICD-10-CM

## 2024-05-22 DIAGNOSIS — J02.9 PHARYNGITIS, UNSPECIFIED ETIOLOGY: Primary | ICD-10-CM

## 2024-05-22 LAB — POC RAPID STREP: NEGATIVE

## 2024-05-22 PROCEDURE — 87081 CULTURE SCREEN ONLY: CPT

## 2024-05-22 PROCEDURE — 3008F BODY MASS INDEX DOCD: CPT | Performed by: NURSE PRACTITIONER

## 2024-05-22 PROCEDURE — 3079F DIAST BP 80-89 MM HG: CPT | Performed by: NURSE PRACTITIONER

## 2024-05-22 PROCEDURE — 87880 STREP A ASSAY W/OPTIC: CPT | Performed by: NURSE PRACTITIONER

## 2024-05-22 PROCEDURE — 3074F SYST BP LT 130 MM HG: CPT | Performed by: NURSE PRACTITIONER

## 2024-05-22 PROCEDURE — 3080F DIAST BP >= 90 MM HG: CPT | Performed by: NURSE PRACTITIONER

## 2024-05-22 PROCEDURE — 3077F SYST BP >= 140 MM HG: CPT | Performed by: NURSE PRACTITIONER

## 2024-05-22 PROCEDURE — 99214 OFFICE O/P EST MOD 30 MIN: CPT | Performed by: NURSE PRACTITIONER

## 2024-05-22 PROCEDURE — 1036F TOBACCO NON-USER: CPT | Performed by: NURSE PRACTITIONER

## 2024-05-22 RX ORDER — TRAZODONE HYDROCHLORIDE 50 MG/1
25-100 TABLET ORAL NIGHTLY PRN
Qty: 60 TABLET | Refills: 5 | Status: SHIPPED | OUTPATIENT
Start: 2024-05-22 | End: 2025-05-22

## 2024-05-22 RX ORDER — DOXYCYCLINE 100 MG/1
100 CAPSULE ORAL 2 TIMES DAILY
Qty: 20 CAPSULE | Refills: 0 | Status: SHIPPED | OUTPATIENT
Start: 2024-05-22 | End: 2024-06-01

## 2024-05-22 ASSESSMENT — ANXIETY QUESTIONNAIRES
6. BECOMING EASILY ANNOYED OR IRRITABLE: NOT AT ALL
7. FEELING AFRAID AS IF SOMETHING AWFUL MIGHT HAPPEN: NOT AT ALL
3. WORRYING TOO MUCH ABOUT DIFFERENT THINGS: NOT AT ALL
5. BEING SO RESTLESS THAT IT IS HARD TO SIT STILL: SEVERAL DAYS
GAD7 TOTAL SCORE: 3
IF YOU CHECKED OFF ANY PROBLEMS ON THIS QUESTIONNAIRE, HOW DIFFICULT HAVE THESE PROBLEMS MADE IT FOR YOU TO DO YOUR WORK, TAKE CARE OF THINGS AT HOME, OR GET ALONG WITH OTHER PEOPLE: NOT DIFFICULT AT ALL
1. FEELING NERVOUS, ANXIOUS, OR ON EDGE: SEVERAL DAYS
2. NOT BEING ABLE TO STOP OR CONTROL WORRYING: NOT AT ALL
4. TROUBLE RELAXING: SEVERAL DAYS

## 2024-05-22 ASSESSMENT — ENCOUNTER SYMPTOMS
FEVER: 0
SORE THROAT: 1
COUGH: 1
SINUS PRESSURE: 0
PALPITATIONS: 0
RHINORRHEA: 0
ARTHRALGIAS: 0
VOICE CHANGE: 1
VOMITING: 0
MYALGIAS: 0
CHEST TIGHTNESS: 0
HEADACHES: 0
EYE DISCHARGE: 0
CHILLS: 0
FACIAL SWELLING: 0
EYE REDNESS: 0
FATIGUE: 0
ABDOMINAL PAIN: 0
DIARRHEA: 0

## 2024-05-22 ASSESSMENT — PATIENT HEALTH QUESTIONNAIRE - PHQ9
3. TROUBLE FALLING OR STAYING ASLEEP OR SLEEPING TOO MUCH: NEARLY EVERY DAY
1. LITTLE INTEREST OR PLEASURE IN DOING THINGS: NOT AT ALL
SUM OF ALL RESPONSES TO PHQ QUESTIONS 1-9: 5
8. MOVING OR SPEAKING SO SLOWLY THAT OTHER PEOPLE COULD HAVE NOTICED. OR THE OPPOSITE, BEING SO FIGETY OR RESTLESS THAT YOU HAVE BEEN MOVING AROUND A LOT MORE THAN USUAL: NOT AT ALL
10. IF YOU CHECKED OFF ANY PROBLEMS, HOW DIFFICULT HAVE THESE PROBLEMS MADE IT FOR YOU TO DO YOUR WORK, TAKE CARE OF THINGS AT HOME, OR GET ALONG WITH OTHER PEOPLE: NOT DIFFICULT AT ALL
SUM OF ALL RESPONSES TO PHQ9 QUESTIONS 1 AND 2: 0
4. FEELING TIRED OR HAVING LITTLE ENERGY: SEVERAL DAYS
2. FEELING DOWN, DEPRESSED OR HOPELESS: NOT AT ALL
6. FEELING BAD ABOUT YOURSELF - OR THAT YOU ARE A FAILURE OR HAVE LET YOURSELF OR YOUR FAMILY DOWN: NOT AT ALL
5. POOR APPETITE OR OVEREATING: SEVERAL DAYS
9. THOUGHTS THAT YOU WOULD BE BETTER OFF DEAD, OR OF HURTING YOURSELF: NOT AT ALL
7. TROUBLE CONCENTRATING ON THINGS, SUCH AS READING THE NEWSPAPER OR WATCHING TELEVISION: NOT AT ALL

## 2024-05-22 NOTE — PATIENT INSTRUCTIONS
Rapid strep negative  Throat culture back thurs or fri  Doxycycline  Zyrtec 1 a day x 2wks  Fluids  Rest  Hot tea with honey and lemon  Call if sx worsen or change or not starting to get better in 2-3d    Trazodone for sleep  We will reassess the duloxetine re: orgasm at next appt      Exercise-cardio 4-5d/wk 30min each day  Diet-Breakfast-toast (my favorite Nichelle Alan Delightful multi-grain and Mann's Killer Bread thin-sliced Good Seed)/bagel/English muffin-whole wheat flour as a 1st ingredient or cereal/oatmeal/granola bar-fiber 4g or more; protein like eggs or peanut butter is Ok  Lunch-protein, veg 1c  Dinner-protein, veg 1c; if having potatoes, rice, noodles, or pasta-->fist sized; could try brown rice or whole wheat pasta or quinoa  Fruit 2 a day  Dairy 2 a day-milk, almond milk, soy milk, yogurt, cottage cheese, yogurt  Snacks-Protein-hard boiled egg, nuts (walnuts/almonds/pecans/pistachios 1/4c), hummus, beef/deer jerky; vegetable, fruit, dairy-milk(1%, skim, almond, soy)/cheese (not a lot of cheddar)/yogurt (Greek is best-my favorite Dannon Fruit on the Bottom Greek)/cottage cheese 2%; triscuits, popcorn have a lot of fiber; serving size  Water  Limit alcohol to 2 drinks per day (1 drink=12oz beer or 5oz wine or 1 1/2oz liquor)  appt in  1  month; be fasting      I will communicate with you via [employer]) regarding messages and results. If you need help with this, you can call the support line at [phone].    IT WAS A PLEASURE TO SEE YOU TODAY. THANK YOU FOR CHOOSING US FOR YOUR HEALTHCARE NEEDS.          I will communicate with you via [employer]) regarding messages and results. If you need help with this, you can call the support line at [phone].    IT WAS A PLEASURE TO SEE YOU TODAY. THANK YOU FOR CHOOSING US FOR YOUR HEALTHCARE NEEDS.

## 2024-05-22 NOTE — PROGRESS NOTES
"Subjective   Patient ID: Xiomara Myers \"Antony" is a 39 y.o. female who presents for Follow-up.  Last physical: 4/18/24  Last mammo-5/3/24  Last labs-4/2024 Sugar (aka glucose), kidney function, liver function and electrolytes in the CMP (comprehensive metabolic panel) were normal.  CBC (complete blood count) was normal which looks at infection and anemia markers.  TSH (thyroid test) was normal.  Ldl not calculated because triglycerides are very high at 488. Goal <150. Are you taking fish oil 2 twice a day consistently? Please send me a message to let me know. Decr carbs and sugars.  Hdl is low at 37. Goal >50. Incr exercise.   Ldl is high at 139. Goal <100. Decr fats and incr fiber.   Pt incr fish oil to 2 bid    *Did pt set up therapist appt? Yes   When is her next appt? Friday   *Did pt see derm dr for skin tags? Yes   Is the cough still better? Yes   Bps at home- 132/91, 126/85, 135/94, 118/86  Still having anxiety and difficulty sleeping with the duloxetine? Anxiety better, but still trouble sleeping   Is pt taking fish oil 2 bid consistently? Yes   Last xanax use- couple weeks ago   Any other questions or concerns that pt wants to discuss today?  Pt does have some post nasal drip, sore throat, loss of voice. Weight    Phq9, gad7  Need csa for xanax    HPI  Pt does have some post nasal drip-yellow, sore throat, loss of voice x 4-5d  Cough from tickle/clear throat  No fever or chills  No sob or wheezing  No runny nose or stuffy nose  Selftxt: mucinex yesterday  Has seasonal allergies    Zepbound not covered until has 6mon of diet and exercise  Exercise- will work on that in june  Diet-3 meals  Snacks-tortilla chips and salsa  Water, am coffee-expresso with skim milk and syrup  Decr eating out  Making better choices  4/18/24 wt 278#, today wt-272#    4/18/24 phq9=10, today=5  4/18/24 gad7=12, today=3  Last xanax use-2wks ago  On duloxetine; Anxiety better, but still hard to stay asleep; having orgasm " difficulties      Stresses-family member in hospital, grandma   Fluoxetine-body tension  Sertraline-wt gain, fatigue  Lexapro-heavy period  Therapist >10yrs ago; started to see one again  No psychiatrist  Fh mental illness: none  No suicidal thoughts or plans    Csa today  Uds 24  I have personally reviewed the O[facility] report for this patient. I have considered the risks of abuse, dependence, addiction and diversion. No concerns.        No care team member to display     Review of Systems   Constitutional: Negative.  Negative for chills, fatigue and fever.   HENT:  Positive for postnasal drip, sore throat and voice change. Negative for congestion, ear discharge, ear pain, facial swelling, rhinorrhea and sinus pressure.    Eyes:  Negative for discharge and redness.   Respiratory:  Positive for cough. Negative for chest tightness, shortness of breath and wheezing.    Cardiovascular:  Negative for chest pain, palpitations and leg swelling.   Gastrointestinal:  Negative for abdominal pain, diarrhea and vomiting.   Musculoskeletal:  Negative for arthralgias and myalgias.   Skin:  Negative for rash.   Neurological:  Negative for headaches.       Objective   Visit Vitals  BP (!) 143/91   Pulse 104   Temp 36.3 °C (97.3 °F)      BP Readings from Last 3 Encounters:   24 (!) 143/91   24 115/88   24 (!) 147/95     Wt Readings from Last 3 Encounters:   24 123 kg (272 lb)   24 126 kg (278 lb)   23 121 kg (267 lb)       Physical Exam  Constitutional:       General: She is not in acute distress.     Appearance: Normal appearance.   Neurological:      Mental Status: She is alert.         Assessment/Plan   Diagnoses and all orders for this visit:  Pharyngitis, unspecified etiology  -     POCT rapid strep A manually resulted  -     Group A Streptococcus, Culture; Future  BMI 40.0-44.9, adult (Multi)  Morbid obesity (Multi)  Mixed hyperlipidemia  Benign essential hypertension  Anxiety  Other  orders  -     Follow Up In Primary Care - Established  -     Follow Up In Primary Care - Established; Future

## 2024-05-24 LAB — S PYO THROAT QL CULT: NORMAL

## 2024-07-01 ENCOUNTER — APPOINTMENT (OUTPATIENT)
Dept: PRIMARY CARE | Facility: CLINIC | Age: 39
End: 2024-07-01
Payer: COMMERCIAL

## 2024-07-18 NOTE — PROGRESS NOTES
"Subjective   Patient ID: Xiomara Myers \"Antony" is a 39 y.o. female who presents for Follow-up.  Last physical: 4/18/24  Last mammo-5/3/24  Last labs-4/2024 Sugar (aka glucose), kidney function, liver function and electrolytes in the CMP (comprehensive metabolic panel) were normal.  CBC (complete blood count) was normal which looks at infection and anemia markers.  TSH (thyroid test) was normal.  Ldl not calculated because triglycerides are very high at 488. Goal <150. Are you taking fish oil 2 twice a day consistently? Please send me a message to let me know. Decr carbs and sugars.  Hdl is low at 37. Goal >50. Incr exercise.   Ldl is high at 139. Goal <100. Decr fats and incr fiber.   Pt incr fish oil to 2 bid    Not fasting  Bps at home- does not do   Is pt taking fish oil 2 bid consistently?  Yes   Last xanax use- 2-3 weeks ago   Is trazodone helping sleep yes  Any other questions or concerns that pt wants to discuss today? Anxiety meds seems to be making periods irregular.       HPI  Zepbound not covered until has 6mon of diet and exercise  Exercise- walking, spinning  Father in law in the hospital, sick in june    Diet-3 meals  Snacks-tortilla chips and salsa  Water, am coffee-expresso with skim milk and syrup  Decr eating out  Making better choices  4/18/24 wt 278#, 5/22/24 wt-272#, today wt 280#    Anxiety meds seems to be making periods irregular.   Last xanax use 2-3 wks ago  Trazodone helping sleep  Duloxetine-orgasm issues on this  No period since started duloxetine and dry mouth  Seeing therapist  Phq9=6, gad7=5    Due for lipid lab    No care team member to display     Review of Systems   Constitutional: Negative.    Respiratory:  Negative for shortness of breath and wheezing.    Cardiovascular:  Negative for chest pain.       Objective   Visit Vitals  BP (!) 155/106   Pulse 97   Temp 36.2 °C (97.1 °F)        BP Readings from Last 3 Encounters:   07/22/24 (!) 155/106   05/22/24 128/87   04/25/24 115/88 "     Wt Readings from Last 3 Encounters:   07/22/24 127 kg (280 lb 3.2 oz)   05/22/24 123 kg (272 lb)   04/18/24 126 kg (278 lb)       Physical Exam  Constitutional:       General: She is not in acute distress.     Appearance: Normal appearance.   Neurological:      Mental Status: She is alert.         Assessment/Plan   Diagnoses and all orders for this visit:  Benign essential hypertension  Mixed hyperlipidemia  -     Lipid Panel; Future  Anxiety  -     Referral to Psychiatry; Future  -     vortioxetine (Trintellix) 5 mg tablet tablet; Take 1 tablet (5 mg) by mouth once daily.  Other orders  -     Follow Up In Primary Care - Established  -     Follow Up In Primary Care - Established; Future

## 2024-07-22 ENCOUNTER — APPOINTMENT (OUTPATIENT)
Dept: PRIMARY CARE | Facility: CLINIC | Age: 39
End: 2024-07-22
Payer: COMMERCIAL

## 2024-07-22 VITALS
HEART RATE: 97 BPM | OXYGEN SATURATION: 96 % | HEIGHT: 68 IN | WEIGHT: 280.2 LBS | TEMPERATURE: 97.1 F | SYSTOLIC BLOOD PRESSURE: 135 MMHG | BODY MASS INDEX: 42.47 KG/M2 | DIASTOLIC BLOOD PRESSURE: 89 MMHG

## 2024-07-22 DIAGNOSIS — I10 BENIGN ESSENTIAL HYPERTENSION: Primary | ICD-10-CM

## 2024-07-22 DIAGNOSIS — E78.2 MIXED HYPERLIPIDEMIA: ICD-10-CM

## 2024-07-22 DIAGNOSIS — F41.9 ANXIETY: ICD-10-CM

## 2024-07-22 PROCEDURE — 3075F SYST BP GE 130 - 139MM HG: CPT | Performed by: NURSE PRACTITIONER

## 2024-07-22 PROCEDURE — 1036F TOBACCO NON-USER: CPT | Performed by: NURSE PRACTITIONER

## 2024-07-22 PROCEDURE — 99213 OFFICE O/P EST LOW 20 MIN: CPT | Performed by: NURSE PRACTITIONER

## 2024-07-22 PROCEDURE — 3079F DIAST BP 80-89 MM HG: CPT | Performed by: NURSE PRACTITIONER

## 2024-07-22 PROCEDURE — 3008F BODY MASS INDEX DOCD: CPT | Performed by: NURSE PRACTITIONER

## 2024-07-22 ASSESSMENT — ANXIETY QUESTIONNAIRES
7. FEELING AFRAID AS IF SOMETHING AWFUL MIGHT HAPPEN: NOT AT ALL
4. TROUBLE RELAXING: SEVERAL DAYS
5. BEING SO RESTLESS THAT IT IS HARD TO SIT STILL: SEVERAL DAYS
6. BECOMING EASILY ANNOYED OR IRRITABLE: NOT AT ALL
1. FEELING NERVOUS, ANXIOUS, OR ON EDGE: SEVERAL DAYS
3. WORRYING TOO MUCH ABOUT DIFFERENT THINGS: SEVERAL DAYS
IF YOU CHECKED OFF ANY PROBLEMS ON THIS QUESTIONNAIRE, HOW DIFFICULT HAVE THESE PROBLEMS MADE IT FOR YOU TO DO YOUR WORK, TAKE CARE OF THINGS AT HOME, OR GET ALONG WITH OTHER PEOPLE: SOMEWHAT DIFFICULT
2. NOT BEING ABLE TO STOP OR CONTROL WORRYING: SEVERAL DAYS
GAD7 TOTAL SCORE: 5

## 2024-07-22 ASSESSMENT — ENCOUNTER SYMPTOMS
SHORTNESS OF BREATH: 0
WHEEZING: 0
CONSTITUTIONAL NEGATIVE: 1

## 2024-07-22 ASSESSMENT — PATIENT HEALTH QUESTIONNAIRE - PHQ9
10. IF YOU CHECKED OFF ANY PROBLEMS, HOW DIFFICULT HAVE THESE PROBLEMS MADE IT FOR YOU TO DO YOUR WORK, TAKE CARE OF THINGS AT HOME, OR GET ALONG WITH OTHER PEOPLE: SOMEWHAT DIFFICULT
6. FEELING BAD ABOUT YOURSELF - OR THAT YOU ARE A FAILURE OR HAVE LET YOURSELF OR YOUR FAMILY DOWN: NOT AT ALL
SUM OF ALL RESPONSES TO PHQ QUESTIONS 1-9: 6
1. LITTLE INTEREST OR PLEASURE IN DOING THINGS: SEVERAL DAYS
4. FEELING TIRED OR HAVING LITTLE ENERGY: SEVERAL DAYS
9. THOUGHTS THAT YOU WOULD BE BETTER OFF DEAD, OR OF HURTING YOURSELF: NOT AT ALL
5. POOR APPETITE OR OVEREATING: MORE THAN HALF THE DAYS
SUM OF ALL RESPONSES TO PHQ9 QUESTIONS 1 AND 2: 1
3. TROUBLE FALLING OR STAYING ASLEEP OR SLEEPING TOO MUCH: NOT AT ALL
2. FEELING DOWN, DEPRESSED OR HOPELESS: NOT AT ALL
7. TROUBLE CONCENTRATING ON THINGS, SUCH AS READING THE NEWSPAPER OR WATCHING TELEVISION: MORE THAN HALF THE DAYS
8. MOVING OR SPEAKING SO SLOWLY THAT OTHER PEOPLE COULD HAVE NOTICED. OR THE OPPOSITE, BEING SO FIGETY OR RESTLESS THAT YOU HAVE BEEN MOVING AROUND A LOT MORE THAN USUAL: NOT AT ALL

## 2024-07-22 NOTE — PATIENT INSTRUCTIONS
Continue to incr exercise  3 meals  1600cal  Incr fruit and veggies (at least 2 veggies a day)  Incr water    Duloxetine every other day x 1wk then stop  Trintellix-start in 2wks  Let me know if you have side effects  Consider going back on fluoxetine  Refer to psychiatry to manage medication    Continue trazodone for sleep  Continue xanax as needed    Fasting lab  You can use the lab in our building when fasting. The hrs are: Monday-Friday, 7 a.m. - 5 p.m., Saturday 8 a.m. - 12 noon.   No appt needed, BUT YOU DO NEED THE PAPER ORDER.    Fasting is no food, drink, gum or mints other than water for 8-12 hrs.   Results will be back in 2-3 business days for most labs. It is always recommended for any orders (labs, xrays, ultrasounds,MRI, ct scan, procedures etc) to check with your insurance provider for expected costs or expenses to you.

## 2024-08-22 DIAGNOSIS — I10 BENIGN ESSENTIAL HYPERTENSION: ICD-10-CM

## 2024-08-22 RX ORDER — LOSARTAN POTASSIUM 100 MG/1
TABLET ORAL
Qty: 90 TABLET | Refills: 2 | Status: SHIPPED | OUTPATIENT
Start: 2024-08-22

## 2024-08-29 ENCOUNTER — LAB (OUTPATIENT)
Dept: LAB | Facility: LAB | Age: 39
End: 2024-08-29
Payer: COMMERCIAL

## 2024-08-29 DIAGNOSIS — E78.2 MIXED HYPERLIPIDEMIA: ICD-10-CM

## 2024-08-29 PROCEDURE — 83721 ASSAY OF BLOOD LIPOPROTEIN: CPT

## 2024-08-29 PROCEDURE — 80061 LIPID PANEL: CPT

## 2024-08-29 PROCEDURE — 36415 COLL VENOUS BLD VENIPUNCTURE: CPT

## 2024-08-30 ENCOUNTER — PATIENT MESSAGE (OUTPATIENT)
Dept: PRIMARY CARE | Facility: CLINIC | Age: 39
End: 2024-08-30
Payer: COMMERCIAL

## 2024-08-30 DIAGNOSIS — E78.2 MIXED HYPERLIPIDEMIA: Primary | ICD-10-CM

## 2024-08-30 LAB
CHOLEST SERPL-MCNC: 259 MG/DL (ref 0–199)
CHOLESTEROL/HDL RATIO: 6.7
HDLC SERPL-MCNC: 38.7 MG/DL
LDLC SERPL CALC-MCNC: ABNORMAL MG/DL
LDLC SERPL DIRECT ASSAY-MCNC: 134 MG/DL (ref 0–129)
NON HDL CHOLESTEROL: 220 MG/DL (ref 0–149)
TRIGL SERPL-MCNC: 460 MG/DL (ref 0–149)
VLDL: ABNORMAL

## 2024-08-31 ASSESSMENT — ENCOUNTER SYMPTOMS
CONSTITUTIONAL NEGATIVE: 1
SHORTNESS OF BREATH: 0
WHEEZING: 0

## 2024-08-31 NOTE — PROGRESS NOTES
"Subjective   Patient ID: Xiomara Myers \"Antony" is a 39 y.o. female who presents for Follow-up.  Last physical: 4/18/24  Last mammo-5/3/24  Last labs-8/2024 Trigs very high 460. Goal <150. Last time it was 245. Decr carbs and sugars. Are you taking fish oil 2 twice a day consistently? If no, please restart and recheck cholesterol lab in  2mon. Let me know by sending me a message. Was not taking 2 bid  Ldl cannot be calculated with this lab when trigs are >400. I added on a direct calculation of the ldl so we should get this back today. Ldl 134  Hdl low at 38. Goal >50 for women. Incr exercise.  4/2024 Sugar (aka glucose), kidney function, liver function and electrolytes in the CMP (comprehensive metabolic panel) were normal.  CBC (complete blood count) was normal which looks at infection and anemia markers.  TSH (thyroid test) was normal.  Ldl not calculated because triglycerides are very high at 488. Goal <150. Are you taking fish oil 2 twice a day consistently? Please send me a message to let me know. Decr carbs and sugars.  Hdl is low at 37. Goal >50. Incr exercise.   Ldl is high at 139. Goal <100. Decr fats and incr fiber.       Does pt want a flu shot? Yes   Bps at home- 138/88 to low 90  Did pt set up psychiatrist appt re: anxiety? Sees therapist and they are gonna wait til things settle   Any other questions or concerns that pt wants to discuss today? No     Incr fish oil to 2 bid recently  Wants dietician referral-referral in- has appt in nov    HPI  Zepbound not covered until has 6mon of diet and exercise-4th appt today  Nausea off of last med  Exercise- walking, spinning  Diet-3 meals-starting to decr carbs to <100g a day; calories/d-  Discussed 1600cal a day at last appt  Snacks-tortilla chips and salsa  Water, am coffee-expresso with skim milk and syrup  Decr eating out  Making better choices  4/18/24 wt 278#, 5/22/24 wt-272#, 7/22/24 wt 280#, wt llflw=071#    Print lipid lab for 10/30+  Has incr fish " oil to 2 bid    No care team member to display     Review of Systems   Constitutional: Negative.    Respiratory:  Negative for shortness of breath and wheezing.    Cardiovascular:  Negative for chest pain.       Objective   Visit Vitals  BP (!) 137/92   Pulse 90   Temp 36.3 °C (97.3 °F)          BP Readings from Last 3 Encounters:   09/03/24 (!) 137/92   07/22/24 135/89   05/22/24 128/87     Wt Readings from Last 3 Encounters:   09/03/24 128 kg (281 lb 9.6 oz)   07/22/24 127 kg (280 lb 3.2 oz)   05/22/24 123 kg (272 lb)       Physical Exam  Constitutional:       General: She is not in acute distress.     Appearance: Normal appearance.   Neurological:      Mental Status: She is alert.         Assessment/Plan   Diagnoses and all orders for this visit:  BMI 40.0-44.9, adult (Multi)  -     Referral to Nutrition Services; Future  Morbid obesity (Multi)  -     Referral to Nutrition Services; Future  Benign essential hypertension  -     Referral to Nutrition Services; Future  Mixed hyperlipidemia  -     Referral to Nutrition Services; Future  Other orders  -     Follow Up In Primary Care - Established  -     Flu vaccine, trivalent, preservative free, age 6 months and greater (Fluraix/Fluzone/Flulaval)  -     Follow Up In Primary Care - Established; Future

## 2024-08-31 NOTE — PATIENT INSTRUCTIONS
Fish oil 2 twice a day  Recheck cholesterol lab 10/30 or after. Be fasting. No appt needed    Work towards 1600cal  Slowly increase exercise to 3d a wk  Continue water    Check bp daily  Goal <140/<90  I will message you in 1wk to obtain bps    Return in 1mon.      I will communicate with you via [employer] regarding messages and results. If you need help with this, you can call the support line at [phone].    IT WAS A PLEASURE TO SEE YOU TODAY. THANK YOU FOR CHOOSING US FOR YOUR HEALTHCARE NEEDS.

## 2024-09-03 ENCOUNTER — APPOINTMENT (OUTPATIENT)
Dept: PRIMARY CARE | Facility: CLINIC | Age: 39
End: 2024-09-03
Payer: COMMERCIAL

## 2024-09-03 VITALS
HEART RATE: 90 BPM | DIASTOLIC BLOOD PRESSURE: 92 MMHG | SYSTOLIC BLOOD PRESSURE: 137 MMHG | HEIGHT: 68 IN | TEMPERATURE: 97.3 F | BODY MASS INDEX: 42.68 KG/M2 | WEIGHT: 281.6 LBS | OXYGEN SATURATION: 96 %

## 2024-09-03 DIAGNOSIS — I10 BENIGN ESSENTIAL HYPERTENSION: Primary | ICD-10-CM

## 2024-09-03 DIAGNOSIS — E66.01 MORBID OBESITY (MULTI): ICD-10-CM

## 2024-09-03 DIAGNOSIS — E78.2 MIXED HYPERLIPIDEMIA: ICD-10-CM

## 2024-09-03 PROCEDURE — 3008F BODY MASS INDEX DOCD: CPT | Performed by: NURSE PRACTITIONER

## 2024-09-03 PROCEDURE — 99213 OFFICE O/P EST LOW 20 MIN: CPT | Performed by: NURSE PRACTITIONER

## 2024-09-03 PROCEDURE — 90656 IIV3 VACC NO PRSV 0.5 ML IM: CPT | Performed by: NURSE PRACTITIONER

## 2024-09-03 PROCEDURE — 1036F TOBACCO NON-USER: CPT | Performed by: NURSE PRACTITIONER

## 2024-09-03 PROCEDURE — 90471 IMMUNIZATION ADMIN: CPT | Performed by: NURSE PRACTITIONER

## 2024-09-03 PROCEDURE — 3075F SYST BP GE 130 - 139MM HG: CPT | Performed by: NURSE PRACTITIONER

## 2024-09-03 PROCEDURE — 3080F DIAST BP >= 90 MM HG: CPT | Performed by: NURSE PRACTITIONER

## 2024-09-03 ASSESSMENT — PATIENT HEALTH QUESTIONNAIRE - PHQ9
1. LITTLE INTEREST OR PLEASURE IN DOING THINGS: NOT AT ALL
2. FEELING DOWN, DEPRESSED OR HOPELESS: NOT AT ALL
SUM OF ALL RESPONSES TO PHQ9 QUESTIONS 1 AND 2: 0

## 2024-09-12 ENCOUNTER — TELEPHONE (OUTPATIENT)
Dept: PRIMARY CARE | Facility: CLINIC | Age: 39
End: 2024-09-12
Payer: COMMERCIAL

## 2024-09-13 NOTE — TELEPHONE ENCOUNTER
I'll need 5 more numbers.  I will contact pt next wk to get more numbers.    If she has her own bp monitor she can take it when she travels.

## 2024-09-13 NOTE — TELEPHONE ENCOUNTER
130/77, 135/90 has been taking but has been traveling for work so has been difficult to get a lot of numbers.

## 2024-09-23 ENCOUNTER — PATIENT MESSAGE (OUTPATIENT)
Dept: PRIMARY CARE | Facility: CLINIC | Age: 39
End: 2024-09-23
Payer: COMMERCIAL

## 2024-09-23 VITALS — SYSTOLIC BLOOD PRESSURE: 123 MMHG | DIASTOLIC BLOOD PRESSURE: 85 MMHG

## 2024-10-09 ENCOUNTER — APPOINTMENT (OUTPATIENT)
Dept: PRIMARY CARE | Facility: CLINIC | Age: 39
End: 2024-10-09
Payer: COMMERCIAL

## 2024-10-16 ASSESSMENT — ENCOUNTER SYMPTOMS
WHEEZING: 0
SHORTNESS OF BREATH: 0
CONSTITUTIONAL NEGATIVE: 1

## 2024-10-16 NOTE — PROGRESS NOTES
"Subjective   Patient ID: Xiomara Myers \"Antony" is a 39 y.o. female who presents for Follow-up.  Last physical: 4/18/24  Last mammo-5/3/24  Last labs-8/2024 Trigs very high 460. Goal <150. Last time it was 245. Decr carbs and sugars. Are you taking fish oil 2 twice a day consistently? If no, please restart and recheck cholesterol lab in  2mon. Let me know by sending me a message. Was not taking 2 bid  Ldl cannot be calculated with this lab when trigs are >400. I added on a direct calculation of the ldl so we should get this back today. Ldl 134  Hdl low at 38. Goal >50 for women. Incr exercise.  4/2024 Sugar (aka glucose), kidney function, liver function and electrolytes in the CMP (comprehensive metabolic panel) were normal.  CBC (complete blood count) was normal which looks at infection and anemia markers.  TSH (thyroid test) was normal.  Ldl not calculated because triglycerides are very high at 488. Goal <150. Are you taking fish oil 2 twice a day consistently? Please send me a message to let me know. Decr carbs and sugars.  Hdl is low at 37. Goal >50. Incr exercise.   Ldl is high at 139. Goal <100. Decr fats and incr fiber.       Bps at home- 135/88, 118,92,125/89,123/89,129/88  Here to follow up on wt  Any other questions or concerns that pt wants to discuss today?   No      Incr fish oil to 2 bid beginning of sept  has appt with dietician in nov    HPI    Has appt with NP thru [facility] and still seeing therapist  Incr anxiety recently    Zepbound not covered until has 6mon of diet and exercise-started working on this 4/2024    Exercise- walking 2-3d, spinning 3d  Diet-3 meals-starting to decr carbs to <100g a day; calories/p-[id number]-mindful eating; has a h/o of being restrictive with eating in high school  Breakfast-water, coffee, [place] almond butter  Lunch-salad or dish of leftovers,water  Dinner- protein, starch, veggie-home -keto type, water  Snacks- limiting candy, chips, popcorn, pretzels  Decr " eating out  Making better choices  4/18/24 wt 278#, 5/22/24 wt-272#, 7/22/24 wt 280#, 9/3/11=858#, wt nuayp=514#    Was on vacay for 2wks in [place]      lipid lab for 10/30+ (due in 2wks)  Has incr fish oil to 2 bid    Has dietician appt in nov    No care team member to display     Review of Systems   Constitutional: Negative.    Respiratory:  Negative for shortness of breath and wheezing.    Cardiovascular:  Negative for chest pain.       Objective   Visit Vitals  BP (!) 154/95   Pulse 99   Temp 36.4 °C (97.5 °F)            BP Readings from Last 3 Encounters:   10/17/24 (!) 154/95   09/23/24 123/85   09/03/24 (!) 137/92     Wt Readings from Last 3 Encounters:   10/17/24 126 kg (278 lb)   09/03/24 128 kg (281 lb 9.6 oz)   07/22/24 127 kg (280 lb 3.2 oz)       Physical Exam  Constitutional:       General: She is not in acute distress.     Appearance: Normal appearance.   Neurological:      Mental Status: She is alert.         Assessment/Plan   Diagnoses and all orders for this visit:  Benign essential hypertension  -     tirzepatide, weight loss, (Zepbound) 2.5 mg/0.5 mL injection; Inject 2.5 mg under the skin every 7 days.  Mixed hyperlipidemia  -     tirzepatide, weight loss, (Zepbound) 2.5 mg/0.5 mL injection; Inject 2.5 mg under the skin every 7 days.  BMI 40.0-44.9, adult (Multi)  -     tirzepatide, weight loss, (Zepbound) 2.5 mg/0.5 mL injection; Inject 2.5 mg under the skin every 7 days.  Morbid obesity (Multi)  -     tirzepatide, weight loss, (Zepbound) 2.5 mg/0.5 mL injection; Inject 2.5 mg under the skin every 7 days.  Other orders  -     Follow Up In Primary Care - Established  -     Follow Up In Primary Care - Established; Future

## 2024-10-16 NOTE — PATIENT INSTRUCTIONS
Zepbound sent.  We will do Prior auth    Fish oil 2 twice a day  Recheck cholesterol lab in 3mon before next appt. Be fasting. No appt needed     Work towards 1600cal and <100g carbs a day  Continue veggies and 1 fruit a day  Continue exercise 5-6d a wk  Continue water    Keep dietician appt next mon     Return in 3mon.

## 2024-10-17 ENCOUNTER — PATIENT MESSAGE (OUTPATIENT)
Dept: PRIMARY CARE | Facility: CLINIC | Age: 39
End: 2024-10-17

## 2024-10-17 ENCOUNTER — APPOINTMENT (OUTPATIENT)
Dept: PRIMARY CARE | Facility: CLINIC | Age: 39
End: 2024-10-17
Payer: COMMERCIAL

## 2024-10-17 VITALS
DIASTOLIC BLOOD PRESSURE: 89 MMHG | HEIGHT: 68 IN | WEIGHT: 278 LBS | TEMPERATURE: 97.5 F | OXYGEN SATURATION: 95 % | SYSTOLIC BLOOD PRESSURE: 138 MMHG | BODY MASS INDEX: 42.13 KG/M2 | HEART RATE: 99 BPM

## 2024-10-17 DIAGNOSIS — I10 BENIGN ESSENTIAL HYPERTENSION: Primary | ICD-10-CM

## 2024-10-17 DIAGNOSIS — E66.01 MORBID OBESITY (MULTI): ICD-10-CM

## 2024-10-17 DIAGNOSIS — E78.2 MIXED HYPERLIPIDEMIA: ICD-10-CM

## 2024-10-17 PROCEDURE — 1036F TOBACCO NON-USER: CPT | Performed by: NURSE PRACTITIONER

## 2024-10-17 PROCEDURE — 3079F DIAST BP 80-89 MM HG: CPT | Performed by: NURSE PRACTITIONER

## 2024-10-17 PROCEDURE — 3008F BODY MASS INDEX DOCD: CPT | Performed by: NURSE PRACTITIONER

## 2024-10-17 PROCEDURE — 3075F SYST BP GE 130 - 139MM HG: CPT | Performed by: NURSE PRACTITIONER

## 2024-10-17 PROCEDURE — 99214 OFFICE O/P EST MOD 30 MIN: CPT | Performed by: NURSE PRACTITIONER

## 2024-10-17 ASSESSMENT — PATIENT HEALTH QUESTIONNAIRE - PHQ9
SUM OF ALL RESPONSES TO PHQ9 QUESTIONS 1 AND 2: 1
10. IF YOU CHECKED OFF ANY PROBLEMS, HOW DIFFICULT HAVE THESE PROBLEMS MADE IT FOR YOU TO DO YOUR WORK, TAKE CARE OF THINGS AT HOME, OR GET ALONG WITH OTHER PEOPLE: NOT DIFFICULT AT ALL
1. LITTLE INTEREST OR PLEASURE IN DOING THINGS: NOT AT ALL
2. FEELING DOWN, DEPRESSED OR HOPELESS: SEVERAL DAYS

## 2024-11-19 ENCOUNTER — NUTRITION (OUTPATIENT)
Dept: NUTRITION | Facility: CLINIC | Age: 39
End: 2024-11-19
Payer: COMMERCIAL

## 2024-11-19 VITALS — HEIGHT: 68 IN | WEIGHT: 273.37 LBS | BODY MASS INDEX: 41.43 KG/M2

## 2024-11-19 DIAGNOSIS — E66.01 MORBID OBESITY (MULTI): ICD-10-CM

## 2024-11-19 DIAGNOSIS — E78.2 MIXED HYPERLIPIDEMIA: ICD-10-CM

## 2024-11-19 DIAGNOSIS — I10 BENIGN ESSENTIAL HYPERTENSION: ICD-10-CM

## 2024-11-19 PROCEDURE — 97802 MEDICAL NUTRITION INDIV IN: CPT | Performed by: DIETITIAN, REGISTERED

## 2024-11-19 NOTE — PROGRESS NOTES
"Reason for Nutrition Visit:  Pt is a 39 y.o. female being seen at [facility]. Pt was referred by Preeti Garza, APRN-CNP effective 8.31.24.    1. BMI 40.0-44.9, adult (Multi)  Referral to Nutrition Services      2. Morbid obesity (Multi)  Referral to Nutrition Services      3. Benign essential hypertension  Referral to Nutrition Services      4. Mixed hyperlipidemia  Referral to Nutrition Services           Past Medical Hx:  Patient Active Problem List   Diagnosis    Acid reflux    Anxiety    Benign essential hypertension    Insomnia    Mixed hyperlipidemia    Nodule of middle lobe of right lung    Seasonal allergies    Splenomegaly    Routine general medical examination at a health care facility    Encounter for screening mammogram for malignant neoplasm of breast    BMI 40.0-44.9, adult (Multi)    Morbid obesity (Multi)          Current Outpatient Medications:     ALPRAZolam (Xanax) 0.25 mg tablet, Take 1 tablet (0.25 mg) by mouth 3 times a day as needed for anxiety., Disp: 30 tablet, Rfl: 1    calcium acetate 668 mg (169 mg calcium) tablet, Take 500 mg by mouth., Disp: , Rfl:     losartan (Cozaar) 100 mg tablet, TAKE 1 TABLET BY MOUTH EVERY DAY, Disp: 90 tablet, Rfl: 2    omega 3-dha-epa-fish oil (Fish OiL) 1,000 mg (120 mg-180 mg) capsule, Take 2 capsules (2,000 mg) by mouth 2 times a day., Disp: 120 capsule, Rfl: 11    tirzepatide, weight loss, (Zepbound) 5 mg/0.5 mL injection, Inject 5 mg under the skin every 7 days., Disp: 2 mL, Rfl: 0    traZODone (Desyrel) 50 mg tablet, Take 0.5-2 tablets ( mg) by mouth as needed at bedtime for sleep., Disp: 60 tablet, Rfl: 5    vitamin D3-vit K1-vit MK4-MK7 -1,500 mcg capsule, Take by mouth., Disp: , Rfl:      Anthropometrics:  Anthropometrics  Height: 172.7 cm (5' 7.99\")  Weight: 124 kg (273 lb 5.9 oz)  BMI (Calculated): 41.58   Weight change:    Significant Weight Change: No   lb (79 kg)   10/2024 Weight: 278 lbs   11/2024 Weight: 273 lbs     Lab " Results   Component Value Date    CHOL 259 (H) 08/29/2024    LDLF 102 (H) 02/16/2022    TRIG 460 (H) 08/29/2024    HDL 38.7 08/29/2024          Chemistry    Lab Results   Component Value Date/Time     09/03/2021 0830    K 4.1 09/03/2021 0830     09/03/2021 0830    CO2 26 09/03/2021 0830    BUN 10 09/03/2021 0830    CREATININE 0.91 09/03/2021 0830    Lab Results   Component Value Date/Time    CALCIUM 10.0 09/03/2021 0830    ALKPHOS 100 09/03/2021 0830    AST 19 09/03/2021 0830    ALT 20 09/03/2021 0830    BILITOT 0.7 09/03/2021 0830         Food and Nutrition Hx:  Pt states she has a hx of restrictive eating. She does at times  She has started on Zepbound. She is planning on going on a keto diet to  [place] [employer].   Pt wakes up feeling okay. Pt wakes up at 6:00 am. 3 meals are consumed per day.     24 Diet Recall:  Meal 1: Breakfast is at 7:00- 8:00 to include a [employer].   Meal 2: Lunch is at 11:00- 1:00 to include a sandwich with 2 slices of wheat bread with meat and cheese or salad 2 -3 cups of lettuce, 3 ounces of chicken and fruit.     Pt energy drops in the afternoon. A snack is not consumed on this medication. She was snacking on chips and salsa.   Meal 3: Dinner is between 6:00- 7:00 pm to include a home  meal or 3 -6 ounces of fish, 2 cups of vegetables such as broccoli, and 0.5 cup of rice.   Snacks: no bedtime snack. She can be driven for crackers.   Beverages: 64 ounces water and coffee with fat free milk with syrup      Allergies: None  Intolerance: None  Appetite: Good  Intake: >75%  GI Symptoms : bloating and constipation Frequency: frequent  Swallowing Difficulty: No problems with swallowing  Dentition : own    Types of Activities: House/Yard Work    Sleep duration/quality : 7+ hours and disrupted sleepPt sleep between 10:00-6:00 am.   Sleep disorders: none    Supplements: Vitamin D, Calcium, and Fish Oil daily    Feelings of Hunger?: Yes and will eat  Physical Feeling: Does  "not feel fullness and Physically full  Binging: Never  Cravings: Starchy  Energy Levels: Fluctuates    Food Preparation: Patient  Cooking Skills/Barriers: None reported  Grocery Shopping: Patient    Nutrition Focused Physical Exam:    Performed/Deferred: Deferred as pt visually appears well-nourished with no signs of malnutrition    Physical Findings (Nutrition Deficiency/Toxicity)  Hair: Negative  Eyes: Negative  Mouth: Negative  Nails: Positive (central ridges)  Skin: Negative     Estimated Energy Needs:  Energy Needs   Calculated Energy Needs Using Equations  Height: 172.7 cm (5' 7.99\")  Weight Used for Equation Calculations: 124 kg (273 lb 5.9 oz)  [place] Equation (Overweight or Obese Patients): 1963  Equation Chosen to Use by RD: Cerevo  Activity Factor: 1.3  Total Energy Needs: 2550  Estimated Energy Needs  Total Energy Estimated Needs (kCal): 2000 kCal    Nutrition Diagnosis:      Nutrition Interventions:  Medical nutrition therapy was given for wt loss, HLD, and HTN.   Nutrition Counseling: Motivational Interviewing  Coordination of Care: None    Nutrition Recommendations:  1.500- 2,000 calories per day for 1 -2 lb wt loss per week. Adequate protein intake of 79 grams per day. Heart healthy meal plan with a low saturated fat intake to <5 -6 % of energy (less than 11 grams of saturated fat per day), reduced intake of added sugars (<10% of total energy), 25 grams of fiber with intake of viscous fiber to 5 g/day to 10 g/day, plant sterols/stanols to 2 g/day, 1.1 gram of omega three fatty acids, and a 1,500 mg sodium restriction. Increase awareness of hunger and satiety.     Nutrition Goals:  Via teach back method patient verbalized understanding of the following topics:  1) 3 meals and 1 -2 snacks per day. Eating three meals per day can increase the metabolism, this is called the thermal effect of eating. Eating three meals burns more calories than two meals consumed per day. Strive to " consume breakfast within 1 -2 hours of waking to jump start the metabolism. Aim for breakfast at 8:00, lunch at noon, snack at 4:00 pm, dinner at 6:00 and bedtime snack at 9:00 pm.   2) Strive to include protein at the meals and even snacks. It is recommended to consume ~79 grams of protein per day. Protein at meals can increase the metabolism too.  Protein at snacks can sustain energy, stabilize blood glucose, and provide more contentment. Aim meals aim for 21-28 grams of protein or ~3-4 ounces. Protein foods include eggs, egg whites, cheese, nuts, nut butters, Greek yogurt, humus, beans, poultry, meat, fish, tofu, plant-based protein powder, and/or plant-based protein drinks.   3) The recommendation for exercise and health is to participate in 150 minutes of moderate exercise per week. Consider walking 5 days a week for 30 minutes or more. Moderate exercise means that there is an increase in heart rate yet a conversation could still be participated in. If walking for this time is not feasible then consider use of pedometer with aim for 7,000- 10,000 steps to meet this exercise recommendation.     Educational Handouts/Practices: [place], High Protein   Next session: Rajeev SOLER, MS, RDN, LD, MARCO A, MB-EAT-P  Advanced Practice Clinical Dietitian   Mindfulness-Based Eating Awareness Training Practitioner  [facility]   [facility] [place]   [email or web address]  Scheduling Line [phone]  Direct Line [phone]    Readiness to Change : Good  Level of Understanding : Good  Anticipated Compliant : Good

## 2024-11-19 NOTE — PATIENT INSTRUCTIONS
1) 3 meals and 1 -2 snacks per day. Eating three meals per day can increase the metabolism, this is called the thermal effect of eating. Eating three meals burns more calories than two meals consumed per day. Strive to consume breakfast within 1 -2 hours of waking to jump start the metabolism. Aim for breakfast at 8:00, lunch at noon, snack at 4:00 pm, dinner at 6:00 and bedtime snack at 9:00 pm.   2) Strive to include protein at the meals and even snacks. It is recommended to consume ~79 grams of protein per day. Protein at meals can increase the metabolism too.  Protein at snacks can sustain energy, stabilize blood glucose, and provide more contentment. Aim meals aim for 21-28 grams of protein or ~3-4 ounces. Protein foods include eggs, egg whites, cheese, nuts, nut butters, Greek yogurt, humus, beans, poultry, meat, fish, tofu, plant-based protein powder, and/or plant-based protein drinks.   3) The recommendation for exercise and health is to participate in 150 minutes of moderate exercise per week. Consider walking 5 days a week for 30 minutes or more. Moderate exercise means that there is an increase in heart rate yet a conversation could still be participated in. If walking for this time is not feasible then consider use of pedometer with aim for 7,000- 10,000 steps to meet this exercise recommendation.     Educational Handouts/Practices: Inner/Outer [place], High Protein   Next session: Rajeev SOLER, MS, RDN, LD, MARCO A, MB-EAT-P  Advanced Practice Clinical Dietitian   Mindfulness-Based Eating Awareness Training Practitioner  [facility]   [facility] [place]   [email or web address]  Scheduling Line [phone]  Direct Line [phone]

## 2024-11-21 ENCOUNTER — PATIENT MESSAGE (OUTPATIENT)
Dept: PRIMARY CARE | Facility: CLINIC | Age: 39
End: 2024-11-21
Payer: COMMERCIAL

## 2024-12-19 ENCOUNTER — APPOINTMENT (OUTPATIENT)
Dept: NUTRITION | Facility: CLINIC | Age: 39
End: 2024-12-19
Payer: COMMERCIAL

## 2024-12-19 DIAGNOSIS — E66.01 MORBID OBESITY (MULTI): ICD-10-CM

## 2024-12-19 DIAGNOSIS — E78.2 MIXED HYPERLIPIDEMIA: ICD-10-CM

## 2024-12-19 PROCEDURE — 97803 MED NUTRITION INDIV SUBSEQ: CPT | Performed by: DIETITIAN, REGISTERED

## 2024-12-19 NOTE — PROGRESS NOTES
Reason for Nutrition Visit:  Pt is a 39 y.o. female being seen at [place]. Pt was referred by Preeti Garza, APRN-CNP effective 8.31.24.      1. BMI 40.0-44.9, adult (Multi)        2. Morbid obesity (Multi)        3. Mixed hyperlipidemia           Past Medical Hx:  Patient Active Problem List   Diagnosis    Acid reflux    Anxiety    Benign essential hypertension    Insomnia    Mixed hyperlipidemia    Nodule of middle lobe of right lung    Seasonal allergies    Splenomegaly    Routine general medical examination at a health care facility    Encounter for screening mammogram for malignant neoplasm of breast    BMI 40.0-44.9, adult (Multi)    Morbid obesity (Multi)        Current Outpatient Medications:     ALPRAZolam (Xanax) 0.25 mg tablet, Take 1 tablet (0.25 mg) by mouth 3 times a day as needed for anxiety., Disp: 30 tablet, Rfl: 1    calcium acetate 668 mg (169 mg calcium) tablet, Take 500 mg by mouth., Disp: , Rfl:     losartan (Cozaar) 100 mg tablet, TAKE 1 TABLET BY MOUTH EVERY DAY, Disp: 90 tablet, Rfl: 2    omega 3-dha-epa-fish oil (Fish OiL) 1,000 mg (120 mg-180 mg) capsule, Take 2 capsules (2,000 mg) by mouth 2 times a day., Disp: 120 capsule, Rfl: 11    tirzepatide, weight loss, (Zepbound) 5 mg/0.5 mL injection, Inject 5 mg under the skin every 7 days., Disp: 2 mL, Rfl: 5    traZODone (Desyrel) 50 mg tablet, Take 0.5-2 tablets ( mg) by mouth as needed at bedtime for sleep., Disp: 60 tablet, Rfl: 5    vitamin D3-vit K1-vit MK4-MK7 -1,500 mcg capsule, Take by mouth., Disp: , Rfl:      Anthropometrics:      Weight change:    Significant Weight Change: No   lb (79 kg)   10/2024 Weight: 278 lbs   11/2024 Weight: 273 lbs   12/2024 Weight: 261.5 lbs     Lab Results   Component Value Date    CHOL 259 (H) 08/29/2024    LDLF 102 (H) 02/16/2022    TRIG 460 (H) 08/29/2024    HDL 38.7 08/29/2024          Chemistry    Lab Results   Component Value Date/Time     09/03/2021 0830    K 4.1  09/03/2021 0830     09/03/2021 0830    CO2 26 09/03/2021 0830    BUN 10 09/03/2021 0830    CREATININE 0.91 09/03/2021 0830    Lab Results   Component Value Date/Time    CALCIUM 10.0 09/03/2021 0830    ALKPHOS 100 09/03/2021 0830    AST 19 09/03/2021 0830    ALT 20 09/03/2021 0830    BILITOT 0.7 09/03/2021 0830         Food and Nutrition Hx:  Pt states she has a hx of restrictive eating. She does at times  She has started on Zepbound. She is planning on going on a keto diet to  [employer].   Pt wakes up feeling okay. Pt wakes up at 6:00 am. 3 meals are consumed per day.     Pt had a follow-up appointment. The amount of Zepbound has increased and she became nauseated. The protein shakes has helped to be able to keep protein up. Pt has been trying to get in 80- 100 grams of protein.   Pt wakes up at 6:00 am.     24 Diet Recall:  Meal 1: Breakfast is at 7:00- 8:00 to include Orgain Nutrition Drink (kcal 230, CHO 23. protein 20)  Meal 2: Lunch is at 11:00- 1:00 to include a sandwich with 2 slices of wheat bread with meat and cheese or salad 2 -3 cups of lettuce, 3 ounces of chicken and fruit or salad with lettuce, 0.5 apples, chicken cheese, and light dressing.   Pt energy drops in the afternoon. A snack is now consumed at 4:00 to include cheese and crackers (kcal 150-200, CHO 15, protein 7)   Meal 3: Dinner is between 6:00- 7:00 pm to include a home  meal or 3 -6 ounces of fish, 2 cups of vegetables such as broccoli, and 1 cup of potato or 0.25 cup of rice.    Snacks: no bedtime snack. She can consume another snack.   Beverages: 64 ounces water and coffee with fat free milk with syrup.  CHO intake may be lower and may effect sleep.       Allergies: None  Intolerance: None  Appetite: Good  Intake: >75%  GI Symptoms : bloating and constipation Frequency: frequent  Swallowing Difficulty: No problems with swallowing  Dentition : own    Types of Activities: House/Yard Work  Exercise 2 -3 times a week for 30  "minutes of walking.     Sleep duration/quality : 7+ hours and disrupted sleepPt sleep between 10:00-6:00 am.   Sleep disorders: none    Supplements: Vitamin D, Calcium, and Fish Oil daily    Feelings of Hunger?: Yes and will eat  Physical Feeling: Does not feel fullness and Physically full  Binging: Never  Cravings: Starchy  Energy Levels: Fluctuates    Food Preparation: Patient  Cooking Skills/Barriers: None reported  Grocery Shopping: Patient    Nutrition Focused Physical Exam:    Performed/Deferred: Deferred as pt visually appears well-nourished with no signs of malnutrition    Estimated Energy Needs:  Energy Needs   Calculated Energy Needs Using Equations  Height: 172.7 cm (5' 7.99\")  Weight Used for Equation Calculations: 124 kg (273 lb 5.9 oz)  [place]- [place] Equation (Overweight or Obese Patients): 1963  Equation Chosen to Use by RD: [place]  Activity Factor: 1.3  Total Energy Needs: 2550  Estimated Energy Needs  Total Energy Estimated Needs (kCal): 2000 kCal    Nutrition Diagnosis:  Nutrition Diagnosis     Patient has Nutrition Diagnosis Yes   Diagnosis Status (1) Ongoing   Nutrition Diagnosis 1 Food and nutrition related knowledge deficit   Related to (1) how to eat for wt loss   As Evidenced by (1) reports by pt of need to learn.   Additional Nutrition Diagnosis Diagnosis 2   Diagnosis Status (2) Improving   Nutrition Diagnosis 2 Physicial inactivity   Related to (2) sendentary lifestyle   As Evidenced by (2) reported PAL level of 1.2-1.3.       Nutrition Interventions:  Medical nutrition therapy was given for wt loss, HLD, and HTN.   Nutrition Counseling: Motivational Interviewing  Coordination of Care: None    Nutrition Recommendations:  1,500- 2,000 calories per day for 1 -2 lb wt loss per week. Adequate protein intake of 79 grams per day. Heart healthy meal plan with a low saturated fat intake to <5 -6 % of energy (less than 11 grams of saturated fat per day), reduced intake of added sugars " "(<10% of total energy), 25 grams of fiber with intake of viscous fiber to 5 g/day to 10 g/day, plant sterols/stanols to 2 g/day, 1.1 gram of omega three fatty acids, and a 1,500 mg sodium restriction. Increase awareness of hunger and satiety. Adequate CHO intake to ensure sleep.     Nutrition Goals:  Via teach back method patient verbalized understanding of the following topics:  1) 3 meals and 2 snacks per day strive to have the bedtime snack to help improve sleep.   2) Strive to include protein at the meals and even snacks.   3) The plate method was recommended to help portion foods at meals and to structure. Using a 9\" plate, recommended for 1/2 of the plate to include non-starchy vegetable and suggested to consume this first.  Recommended protein to be included but limited to 3 -4 ounces at meals. Recommended 1/4 of the plate or up to 1 cup of a grain, starch, fruit, milk and/or yogurt at meals.   4) The recommendation for exercise and health is to participate in 150 minutes of moderate exercise per week. Consider walking 5 days a week for 30 minutes or more. Moderate exercise means that there is an increase in heart rate yet a conversation could still be participated in. If walking for this time is not feasible then consider use of pedometer with aim for 7,000- 10,000 steps to meet this exercise recommendation.   5) Even at snacks strive to consume a carbohydrate and protein. Carbohydrates provide energy and protein helps to sustain the energy.     Educational Handouts/Practices: Plate Method (2022)   Inner/Outer [place], High Protein   Next session: RESET BREATHE, movement, increasing water     Amanda Bonner, MS, RDN, LD, FANLALO, MB-EAT-P  Advanced Practice Clinical Dietitian   Mindfulness-Based Eating Awareness Training Practitioner  [facility]   [facility] [place]   [email or web address]  Scheduling Line [phone]  Direct Line [phone]    Readiness " to Change : Good  Level of Understanding : Good  Anticipated Compliant : Good

## 2024-12-20 ENCOUNTER — APPOINTMENT (OUTPATIENT)
Dept: NUTRITION | Facility: CLINIC | Age: 39
End: 2024-12-20
Payer: COMMERCIAL

## 2025-01-15 ENCOUNTER — APPOINTMENT (OUTPATIENT)
Dept: NUTRITION | Facility: CLINIC | Age: 40
End: 2025-01-15
Payer: COMMERCIAL

## 2025-01-15 VITALS — HEIGHT: 68 IN | BODY MASS INDEX: 39.03 KG/M2 | WEIGHT: 257.5 LBS

## 2025-01-15 DIAGNOSIS — E66.01 MORBID OBESITY (MULTI): Primary | ICD-10-CM

## 2025-01-15 DIAGNOSIS — E78.2 MIXED HYPERLIPIDEMIA: ICD-10-CM

## 2025-01-15 PROCEDURE — 97803 MED NUTRITION INDIV SUBSEQ: CPT | Performed by: DIETITIAN, REGISTERED

## 2025-01-15 NOTE — PROGRESS NOTES
"Reason for Nutrition Visit:  Pt is a 39 y.o. female being seen at [place]. Pt was referred by Preeti Garza, APRN-CNP effective 8.31.24.      1. Morbid obesity (Multi)        2. BMI 40.0-44.9, adult (Multi)        3. Mixed hyperlipidemia             Past Medical Hx:  Patient Active Problem List   Diagnosis    Acid reflux    Anxiety    Benign essential hypertension    Insomnia    Mixed hyperlipidemia    Nodule of middle lobe of right lung    Seasonal allergies    Splenomegaly    Routine general medical examination at a health care facility    Encounter for screening mammogram for malignant neoplasm of breast    BMI 40.0-44.9, adult (Multi)    Morbid obesity (Multi)        Current Outpatient Medications:     ALPRAZolam (Xanax) 0.25 mg tablet, Take 1 tablet (0.25 mg) by mouth 3 times a day as needed for anxiety., Disp: 30 tablet, Rfl: 1    calcium acetate 668 mg (169 mg calcium) tablet, Take 500 mg by mouth., Disp: , Rfl:     losartan (Cozaar) 100 mg tablet, TAKE 1 TABLET BY MOUTH EVERY DAY, Disp: 90 tablet, Rfl: 2    omega 3-dha-epa-fish oil (Fish OiL) 1,000 mg (120 mg-180 mg) capsule, Take 2 capsules (2,000 mg) by mouth 2 times a day., Disp: 120 capsule, Rfl: 11    tirzepatide, weight loss, (Zepbound) 5 mg/0.5 mL injection, Inject 5 mg under the skin every 7 days., Disp: 2 mL, Rfl: 5    traZODone (Desyrel) 50 mg tablet, Take 0.5-2 tablets ( mg) by mouth as needed at bedtime for sleep., Disp: 60 tablet, Rfl: 5    vitamin D3-vit K1-vit MK4-MK7 -1,500 mcg capsule, Take by mouth., Disp: , Rfl:      Anthropometrics:  Anthropometrics  Height: 172.7 cm (5' 7.99\")  Weight: 117 kg (257 lb 8 oz)  BMI (Calculated): 39.16   Weight change:    Significant Weight Change: No   lb (79 kg)   10/2024 Weight: 278 lbs   11/2024 Weight: 273 lbs   12/2024 Weight: 261.5 lbs   01/2025 Weight: 257.5    Lab Results   Component Value Date    CHOL 259 (H) 08/29/2024    LDLF 102 (H) 02/16/2022    TRIG 460 (H) 08/29/2024    " HDL 38.7 08/29/2024          Chemistry    Lab Results   Component Value Date/Time     09/03/2021 0830    K 4.1 09/03/2021 0830     09/03/2021 0830    CO2 26 09/03/2021 0830    BUN 10 09/03/2021 0830    CREATININE 0.91 09/03/2021 0830    Lab Results   Component Value Date/Time    CALCIUM 10.0 09/03/2021 0830    ALKPHOS 100 09/03/2021 0830    AST 19 09/03/2021 0830    ALT 20 09/03/2021 0830    BILITOT 0.7 09/03/2021 0830         Food and Nutrition Hx:  Pt states she has a hx of restrictive eating. She does at times  She has started on Zepbound. She is planning on going on a keto diet to  [facility].   Pt wakes up feeling okay. Pt wakes up at 6:00 am. 3 meals are consumed per day.     Pt had a follow-up appointment. Pt states she has been feeling better. She has also started a weight management program that focuses on lower CHO intake as a requirement for the wt loss medication. Her energy drops earlier at 8:00 pm since reducing CHO and she goes to bed. She is consuming 40- 80 grams of CHO per day. They are measuring her ketones. She reports she is able to sleep.   Pt states she has been still taking Zepbound at the 5. She keeps the food noise down. She does feel hunger at times. The protein shakes has helped to be able to keep protein up. Pt has been trying to get in 80- 100 grams of protein.   Pt wakes up at 6:00 am.     24 Diet Recall:  Meal 1: Breakfast is at 7:00- 8:00 to include Orgain Nutrition Drink (kcal 230, CHO 23. protein 20)  Meal 2: Lunch is at 11:00- 1:00 to include a sandwich with 2 slices of wheat bread with meat and cheese or salad 2 -3 cups of lettuce, 3 ounces of chicken and fruit or salad with lettuce, 0.5 apples, chicken cheese, and light dressing.   Pt energy drops in the afternoon. A snack is now consumed at 4:00 to include cheese and crackers (kcal 150-200, CHO 15, protein 7)   Meal 3: Dinner is between 6:00- 7:00 pm to include a home  meal or 3 -6 ounces of fish, 2 cups of  "vegetables such as broccoli, and 1 cup of potato or 0.25 cup of rice.    Snacks: no bedtime snack. She can consume another snack.   Beverages: 64 ounces water and coffee with fat free milk with syrup.  CHO intake may be lower and may effect sleep.       Allergies: None  Intolerance: None  Appetite: Good  Intake: >75%  GI Symptoms : bloating and constipation Frequency: frequent  Swallowing Difficulty: No problems with swallowing  Dentition : own    Types of Activities: House/Yard Work  Exercise 2 -3 times a week for 30 minutes of walking.     Sleep duration/quality : 7+ hours and disrupted sleepPt sleep between 10:00-6:00 am.   Sleep disorders: none    Supplements: Vitamin D, Calcium, and Fish Oil daily    Feelings of Hunger?: Yes and will eat  Physical Feeling: Does not feel fullness and Physically full  Binging: Never  Cravings: Starchy  Energy Levels: Fluctuates    Food Preparation: Patient  Cooking Skills/Barriers: None reported  Grocery Shopping: Patient    Nutrition Focused Physical Exam:    Performed/Deferred: Deferred as pt visually appears well-nourished with no signs of malnutrition    Estimated Energy Needs:  Energy Needs   Calculated Energy Needs Using Equations  Height: 172.7 cm (5' 7.99\")  Weight Used for Equation Calculations: 124 kg (273 lb 5.9 oz)  [place] Chew Equation (Overweight or Obese Patients): 1963  Equation Chosen to Use by RD: Isidro Omer  Activity Factor: 1.3  Total Energy Needs: 2550  Estimated Energy Needs  Total Energy Estimated Needs (kCal): 2000 kCal    Nutrition Diagnosis:  Nutrition Diagnosis     Patient has Nutrition Diagnosis Yes   Diagnosis Status (1) Improving    Nutrition Diagnosis 1 Food and nutrition related knowledge deficit   Related to (1) how to eat for wt loss   As Evidenced by (1) reports by pt of need to learn.   Additional Nutrition Diagnosis Diagnosis 2   Diagnosis Status (2) Improving   Nutrition Diagnosis 2 Physicial inactivity   Related to (2) sendentary " lifestyle   As Evidenced by (2) reported PAL level of 1.2-1.3.     Nutrition Interventions:  Medical nutrition therapy was given for wt loss, HLD, and HTN.   Nutrition Counseling: Motivational Interviewing  Coordination of Care: None    Nutrition Recommendations:  1,500- 2,000 calories per day for 1 -2 lb wt loss per week. Adequate protein intake of 79 grams per day. Heart healthy meal plan with a low saturated fat intake to <5 -6 % of energy (less than 11 grams of saturated fat per day), reduced intake of added sugars (<10% of total energy), 25 grams of fiber with intake of viscous fiber to 5 g/day to 10 g/day, plant sterols/stanols to 2 g/day, 1.1 gram of omega three fatty acids, and a 1,500 mg sodium restriction. Increase awareness of hunger and satiety. Adequate CHO intake to ensure sleep.     Nutrition Goals:  Via teach back method patient verbalized understanding of the following topics:  1) The recommendation for exercise and wt loss is 300- 400 minutes per week. Aim for 60 to 90 minutes of exercise 5 days a week. Incorporate 60 minutes of cardiovascular activity with 30 minutes of resistance training. Incorporate yoga one a day a week. An exercise routine incorporates a 5 minutes of warm up and then stretching, cardiovascular exercise/resistance, 5 -10- minutes of cool down and then stretching.    Educational Handouts/Practices: Exercise for Weight loss recommendation  Plate Method (2022)   Inner/Outer [place], High Protein   Next session: Mindfulness- meditation of the breathe and mindfully eating a raisin     Amanda Bnoner, MS, RDN, LD, FANLALO, MB-EAT-P  Advanced Practice Clinical Dietitian   Mindfulness-Based Eating Awareness Training Practitioner  [facility]   [facility] [place]   [email or web address]  Scheduling Line [phone]  Direct Line [phone]    Readiness to Change : Good  Level of Understanding : Good  Anticipated Compliant : Good

## 2025-01-20 PROBLEM — E66.01 MORBID OBESITY (MULTI): Status: RESOLVED | Noted: 2023-07-06 | Resolved: 2025-01-20

## 2025-01-20 ASSESSMENT — ENCOUNTER SYMPTOMS
SHORTNESS OF BREATH: 0
CONSTITUTIONAL NEGATIVE: 1
WHEEZING: 0

## 2025-01-20 NOTE — PROGRESS NOTES
"Subjective   Patient ID: Xiomara Myers \"Jeanne\" is a 39 y.o. female who presents for Follow-up.  Last physical: 4/18/24  Last mammo-5/3/24  Last labs-8/2024 Trigs very high 460. Goal <150. Last time it was 245. Decr carbs and sugars. Are you taking fish oil 2 twice a day consistently? If no, please restart and recheck cholesterol lab in  2mon. Let me know by sending me a message. Was not taking 2 bid  Ldl cannot be calculated with this lab when trigs are >400. I added on a direct calculation of the ldl so we should get this back today. Ldl 134  Hdl low at 38. Goal >50 for women. Incr exercise.  4/2024 Sugar (aka glucose), kidney function, liver function and electrolytes in the CMP (comprehensive metabolic panel) were normal.  CBC (complete blood count) was normal which looks at infection and anemia markers.  TSH (thyroid test) was normal.  Ldl not calculated because triglycerides are very high at 488. Goal <150. Are you taking fish oil 2 twice a day consistently? Please send me a message to let me know. Decr carbs and sugars.  Hdl is low at 37. Goal >50. Incr exercise.   Ldl is high at 139. Goal <100. Decr fats and incr fiber.       Is pt fasting? No, had blood work done at work to show you.  Bps at home-averaging 120/88-91  Is pt taking fish oil 2 bid consistently? Yes  How is pt doing on the zepbound? Good, no side effects  Would pt like to incr the dose to 7.5mg? No  Here to follow up on wt  Any other questions or concerns that pt wants to discuss today? Xanax refill      Seeing dietician    HPI    still seeing therapist    On zepbound 5mg per wk with program at work  Seeing nutritionist    Exercise- 300-400min per wk-stretch, cardio, wts  Diet-3 meals-starting to decr carbs to <100g a day; calories/[id number]-mindful eating; has a h/o of being restrictive with eating in high school  Breakfast-low carb instead of keto; does not want restrictive; nutritionist helping pt find balance    4/18/24 wt 278#, 5/22/24 " wt-272#, 7/22/24 wt 280#, 9/3/23=953#, wt 10/17/02=036#, wt cjwwz=273#  More energy    on fish oil to 2 bid  Had labs at work yesterday: 1 liver enz high, A1c 5.0, trigs 232, hdl 34, ldl 135, sugar 87    Needs refill on xanax  Csa 5/2024  Uds 4/2024    No care team member to display     Review of Systems   Constitutional: Negative.    Respiratory:  Negative for shortness of breath and wheezing.    Cardiovascular:  Negative for chest pain.       Objective   Visit Vitals  /84   Pulse 88   Temp 36.1 °C (96.9 °F)              BP Readings from Last 3 Encounters:   01/21/25 149/84   10/17/24 138/89   09/23/24 123/85     Wt Readings from Last 3 Encounters:   01/21/25 117 kg (257 lb 6.4 oz)   01/15/25 117 kg (257 lb 8 oz)   11/19/24 124 kg (273 lb 5.9 oz)       Physical Exam  Constitutional:       General: She is not in acute distress.     Appearance: Normal appearance.   Neurological:      Mental Status: She is alert.         Assessment/Plan   Diagnoses and all orders for this visit:  Benign essential hypertension  Mixed hyperlipidemia  Anxiety  -     ALPRAZolam (Xanax) 0.25 mg tablet; Take 1 tablet (0.25 mg) by mouth 3 times a day as needed for anxiety.  BMI 39.0-39.9,adult  Class 2 severe obesity due to excess calories with serious comorbidity and body mass index (BMI) of 39.0 to 39.9 in adult  Other orders  -     Follow Up In Primary Care - Established

## 2025-01-21 ENCOUNTER — APPOINTMENT (OUTPATIENT)
Dept: PRIMARY CARE | Facility: CLINIC | Age: 40
End: 2025-01-21
Payer: COMMERCIAL

## 2025-01-21 VITALS
BODY MASS INDEX: 39.01 KG/M2 | DIASTOLIC BLOOD PRESSURE: 70 MMHG | WEIGHT: 257.4 LBS | HEART RATE: 88 BPM | SYSTOLIC BLOOD PRESSURE: 110 MMHG | HEIGHT: 68 IN | TEMPERATURE: 96.9 F

## 2025-01-21 DIAGNOSIS — F41.9 ANXIETY: ICD-10-CM

## 2025-01-21 DIAGNOSIS — E66.01 CLASS 2 SEVERE OBESITY DUE TO EXCESS CALORIES WITH SERIOUS COMORBIDITY AND BODY MASS INDEX (BMI) OF 39.0 TO 39.9 IN ADULT: ICD-10-CM

## 2025-01-21 DIAGNOSIS — E78.2 MIXED HYPERLIPIDEMIA: ICD-10-CM

## 2025-01-21 DIAGNOSIS — E66.812 CLASS 2 SEVERE OBESITY DUE TO EXCESS CALORIES WITH SERIOUS COMORBIDITY AND BODY MASS INDEX (BMI) OF 39.0 TO 39.9 IN ADULT: ICD-10-CM

## 2025-01-21 DIAGNOSIS — I10 BENIGN ESSENTIAL HYPERTENSION: Primary | ICD-10-CM

## 2025-01-21 PROCEDURE — 3079F DIAST BP 80-89 MM HG: CPT | Performed by: NURSE PRACTITIONER

## 2025-01-21 PROCEDURE — 99214 OFFICE O/P EST MOD 30 MIN: CPT | Performed by: NURSE PRACTITIONER

## 2025-01-21 PROCEDURE — 3077F SYST BP >= 140 MM HG: CPT | Performed by: NURSE PRACTITIONER

## 2025-01-21 PROCEDURE — 3074F SYST BP LT 130 MM HG: CPT | Performed by: NURSE PRACTITIONER

## 2025-01-21 PROCEDURE — 1036F TOBACCO NON-USER: CPT | Performed by: NURSE PRACTITIONER

## 2025-01-21 PROCEDURE — 3078F DIAST BP <80 MM HG: CPT | Performed by: NURSE PRACTITIONER

## 2025-01-21 PROCEDURE — 3008F BODY MASS INDEX DOCD: CPT | Performed by: NURSE PRACTITIONER

## 2025-01-21 RX ORDER — ALPRAZOLAM 0.25 MG/1
0.25 TABLET ORAL 3 TIMES DAILY PRN
Qty: 30 TABLET | Refills: 1 | Status: SHIPPED | OUTPATIENT
Start: 2025-01-21

## 2025-01-21 NOTE — PATIENT INSTRUCTIONS
Continue with work program and nutritionist  Continue zepbound 5mg  Continue fish oil 2 twice a day    Refill xanax    Return in 3mon for wellness appt      I will communicate with you via M.dot regarding messages and results. If you need help with this, you can call the support line at [phone].    IT WAS A PLEASURE TO SEE YOU TODAY. THANK YOU FOR CHOOSING US FOR YOUR HEALTHCARE NEEDS.

## 2025-02-13 ENCOUNTER — APPOINTMENT (OUTPATIENT)
Dept: NUTRITION | Facility: CLINIC | Age: 40
End: 2025-02-13
Payer: COMMERCIAL

## 2025-02-14 ENCOUNTER — TELEMEDICINE CLINICAL SUPPORT (OUTPATIENT)
Dept: NUTRITION | Facility: CLINIC | Age: 40
End: 2025-02-14
Payer: COMMERCIAL

## 2025-02-14 VITALS — WEIGHT: 250 LBS | BODY MASS INDEX: 37.03 KG/M2 | HEIGHT: 69 IN

## 2025-02-14 DIAGNOSIS — E78.2 MIXED HYPERLIPIDEMIA: ICD-10-CM

## 2025-02-14 DIAGNOSIS — E66.812 CLASS 2 SEVERE OBESITY DUE TO EXCESS CALORIES WITH SERIOUS COMORBIDITY AND BODY MASS INDEX (BMI) OF 39.0 TO 39.9 IN ADULT: Primary | ICD-10-CM

## 2025-02-14 DIAGNOSIS — E66.01 CLASS 2 SEVERE OBESITY DUE TO EXCESS CALORIES WITH SERIOUS COMORBIDITY AND BODY MASS INDEX (BMI) OF 39.0 TO 39.9 IN ADULT: Primary | ICD-10-CM

## 2025-02-14 PROCEDURE — 97803 MED NUTRITION INDIV SUBSEQ: CPT | Mod: 95 | Performed by: DIETITIAN, REGISTERED

## 2025-02-14 PROCEDURE — 97803 MED NUTRITION INDIV SUBSEQ: CPT | Performed by: DIETITIAN, REGISTERED

## 2025-02-14 NOTE — PROGRESS NOTES
"Reason for Nutrition Visit:  Pt is a 39 y.o. female being seen at [facility]. Pt was referred by Preeti Garza, APRN-CNP effective 8.31.24.      1. Class 2 severe obesity due to excess calories with serious comorbidity and body mass index (BMI) of 39.0 to 39.9 in adult        2. BMI 39.0-39.9,adult        3. Mixed hyperlipidemia             Past Medical Hx:  Patient Active Problem List   Diagnosis    Acid reflux    Anxiety    Benign essential hypertension    Insomnia    Mixed hyperlipidemia    Nodule of middle lobe of right lung    Seasonal allergies    Splenomegaly    Routine general medical examination at a health care facility    Encounter for screening mammogram for malignant neoplasm of breast    BMI 39.0-39.9,adult    Class 2 severe obesity due to excess calories with serious comorbidity and body mass index (BMI) of 39.0 to 39.9 in adult        Current Outpatient Medications:     ALPRAZolam (Xanax) 0.25 mg tablet, Take 1 tablet (0.25 mg) by mouth 3 times a day as needed for anxiety., Disp: 30 tablet, Rfl: 1    calcium acetate 668 mg (169 mg calcium) tablet, Take 500 mg by mouth., Disp: , Rfl:     losartan (Cozaar) 100 mg tablet, TAKE 1 TABLET BY MOUTH EVERY DAY, Disp: 90 tablet, Rfl: 2    omega 3-dha-epa-fish oil (Fish OiL) 1,000 mg (120 mg-180 mg) capsule, Take 2 capsules (2,000 mg) by mouth 2 times a day., Disp: 120 capsule, Rfl: 11    tirzepatide, weight loss, (Zepbound) 5 mg/0.5 mL injection, Inject 5 mg under the skin every 7 days., Disp: 2 mL, Rfl: 5    traZODone (Desyrel) 50 mg tablet, Take 0.5-2 tablets ( mg) by mouth as needed at bedtime for sleep., Disp: 60 tablet, Rfl: 5    vitamin D3-vit K1-vit MK4-MK7 -1,500 mcg capsule, Take by mouth., Disp: , Rfl:      Anthropometrics:  Anthropometrics  Height: 175.7 cm (5' 9.17\")  Weight: 113 kg (250 lb)  BMI (Calculated): 36.73   Weight change:    Significant Weight Change: No   lb (79 kg)   10/2024 Weight: 278 lbs   11/2024 Weight: 273 lbs "   12/2024 Weight: 261.5 lbs   01/2025 Weight: 257.5  02/2025 Weight: 250 lbs     Lab Results   Component Value Date    CHOL 259 (H) 08/29/2024    LDLF 102 (H) 02/16/2022    TRIG 460 (H) 08/29/2024    HDL 38.7 08/29/2024          Chemistry    Lab Results   Component Value Date/Time     09/03/2021 0830    K 4.1 09/03/2021 0830     09/03/2021 0830    CO2 26 09/03/2021 0830    BUN 10 09/03/2021 0830    CREATININE 0.91 09/03/2021 0830    Lab Results   Component Value Date/Time    CALCIUM 10.0 09/03/2021 0830    ALKPHOS 100 09/03/2021 0830    AST 19 09/03/2021 0830    ALT 20 09/03/2021 0830    BILITOT 0.7 09/03/2021 0830         Food and Nutrition Hx:  Pt states she has a hx of restrictive eating. She does at times  She has started on Zepbound. She is planning on going on a keto diet to  [place].   Pt wakes up feeling okay. Pt wakes up at 6:00 am. 3 meals are consumed per day.     Pt had a follow-up appointment. Pt states she has been feeling better. She has also started a weight management program that focuses on lower CHO intake as a requirement for the wt loss medication. Her energy drops earlier at 8:00 pm since reducing CHO and she goes to bed. She is consuming 40- 80 grams of CHO per day. They are measuring her ketones. She reports she is able to sleep.   Pt states she has been still taking Zepbound at the 5. She keeps the food noise down. She does feel hunger at times. The protein shakes has helped to be able to keep protein up. Pt has been trying to get in 80- 100 grams of protein.   Pt wakes up at 6:00 am.     24 Diet Recall:  Meal 1: Breakfast is at 7:00- 8:00 to include Orgain Nutrition Drink (kcal 230, CHO 23. protein 20)  Meal 2: Lunch is at 11:00- 1:00 to include a sandwich with 2 slices of wheat bread with meat and cheese or salad 2 -3 cups of lettuce, 3 ounces of chicken and fruit or salad with lettuce, 0.5 apples, chicken cheese, and light dressing.   Pt energy drops in the afternoon. A  "snack is now consumed at 4:00 to include cheese and crackers (kcal 150-200, CHO 15, protein 7)   Meal 3: Dinner is between 6:00- 7:00 pm to include a home  meal or 3 -6 ounces of fish, 2 cups of vegetables such as broccoli, and 1 cup of potato or 0.25 cup of rice.    Snacks: no bedtime snack. She can consume another snack.   Beverages: 64 ounces water and coffee with fat free milk with syrup.  CHO intake may be lower and may effect sleep.       Allergies: None  Intolerance: None  Appetite: Good  Intake: >75%  GI Symptoms : bloating and constipation Frequency: frequent  Swallowing Difficulty: No problems with swallowing  Dentition : own    Types of Activities: House/Yard Work  Exercise 2 -3 times a week for 30 minutes of walking.     Sleep duration/quality : 7+ hours and disrupted sleepPt sleep between 10:00-6:00 am.   Sleep disorders: none    Supplements: Vitamin D, Calcium, and Fish Oil daily    Feelings of Hunger?: Yes and will eat  Physical Feeling: Does not feel fullness and Physically full  Binging: Never  Cravings: Starchy  Energy Levels: Fluctuates    Food Preparation: Patient  Cooking Skills/Barriers: None reported  Grocery Shopping: Patient    Nutrition Focused Physical Exam:    Performed/Deferred: Deferred as pt visually appears well-nourished with no signs of malnutrition    Estimated Energy Needs:  Energy Needs   Calculated Energy Needs Using Equations  Height: 172.7 cm (5' 7.99\")  Weight Used for Equation Calculations: 124 kg (273 lb 5.9 oz)  Vernell Chew Equation (Overweight or Obese Patients): 1963  Equation Chosen to Use by RD: Isidro Omer  Activity Factor: 1.3  Total Energy Needs: 2550  Estimated Energy Needs  Total Energy Estimated Needs (kCal): 2000 kCal    Nutrition Diagnosis:  Nutrition Diagnosis     Patient has Nutrition Diagnosis Yes   Diagnosis Status (1) Active   Nutrition Diagnosis 1 Food and nutrition related knowledge deficit   Related to (1) how to eat for weight loss   As " Evidenced by (1) reports by pt of the need to learn.   Additional Nutrition Diagnosis Diagnosis 2   Diagnosis Status (2) Active   Nutrition Diagnosis 2 Physical inactivity   Related to (2) sedentary lifestyle   As Evidenced by (2) reported PAL level of 1.2-1.3.     Nutrition Interventions:  Medical nutrition therapy was given for wt loss, HLD, and HTN.   Nutrition Counseling: Motivational Interviewing  Coordination of Care: None    Nutrition Recommendations:  1,500- 2,000 calories per day for 1 -2 lb wt loss per week. Adequate protein intake of 79 grams per day. Heart healthy meal plan with a low saturated fat intake to <5 -6 % of energy (less than 11 grams of saturated fat per day), reduced intake of added sugars (<10% of total energy), 25 grams of fiber with intake of viscous fiber to 5 g/day to 10 g/day, plant sterols/stanols to 2 g/day, 1.1 gram of omega three fatty acids, and a 1,500 mg sodium restriction. Increase awareness of hunger and satiety. Adequate CHO intake to ensure sleep.     Nutrition Goals:  Via teach back method patient verbalized understanding of the following topics:  1) The recommendation for exercise and wt loss is 300- 400 minutes per week. Aim for 60 to 90 minutes of exercise 5 days a week. Incorporate 60 minutes of cardiovascular activity with 30 minutes of resistance training. Incorporate yoga one a day a week. An exercise routine incorporates a 5 minutes of warm up and then stretching, cardiovascular exercise/resistance, 5 -10- minutes of cool down and then stretching.  2) Keep balance worksheet was given. Complete this worksheet, looking for patterns of eating/movement that you would like to keep a balance. Think about ways you can gradually make changes, either in increasing or decreasing doing something that will be helpful in changing eating or exercise behavior. Consider identifying at least three patterns related to eating or exercise to focus on. Strive to select one goal that’s  relatively easy and then select a more challenging goal.   3) A general mindfulness meditation was given today. Setting aside some time for general mindfulness meditation can help us become centered and more mindful by focusing on breath. Attached is the general mindfulness meditation. Set a regular time to meditate with aim for 6 days a week could help increase mindfulness related to eating. Use the meditation tracking sheet to keep track of meditations.     Educational Handouts/Practices: Mindfulness of the Breath, Chattering Mind, Keep in Balance Assessment  Exercise for Weight loss recommendation  Plate Method (2022)   Inner/Outer [place], High Protein   Next session: Mindfulness-  mindfully eating raisins (bring three 3 raisins)     Amanda Bonner, MS, RDN, LD, MARCO A, MB-EAT-P  Advanced Practice Clinical Dietitian   Mindfulness-Based Eating Awareness Training Practitioner  [facility]   [facility] [place]   [email or web address]  Scheduling Line [phone]  Direct Line [phone]    Readiness to Change : Good  Level of Understanding : Good  Anticipated Compliant : Good

## 2025-03-14 ENCOUNTER — APPOINTMENT (OUTPATIENT)
Dept: NUTRITION | Facility: CLINIC | Age: 40
End: 2025-03-14
Payer: COMMERCIAL

## 2025-03-28 ENCOUNTER — TELEMEDICINE CLINICAL SUPPORT (OUTPATIENT)
Dept: NUTRITION | Facility: CLINIC | Age: 40
End: 2025-03-28
Payer: COMMERCIAL

## 2025-03-28 DIAGNOSIS — E66.812 CLASS 2 SEVERE OBESITY DUE TO EXCESS CALORIES WITH SERIOUS COMORBIDITY AND BODY MASS INDEX (BMI) OF 39.0 TO 39.9 IN ADULT: Primary | ICD-10-CM

## 2025-03-28 DIAGNOSIS — E66.01 CLASS 2 SEVERE OBESITY DUE TO EXCESS CALORIES WITH SERIOUS COMORBIDITY AND BODY MASS INDEX (BMI) OF 39.0 TO 39.9 IN ADULT: Primary | ICD-10-CM

## 2025-03-28 DIAGNOSIS — E78.2 MIXED HYPERLIPIDEMIA: ICD-10-CM

## 2025-03-28 PROCEDURE — 97803 MED NUTRITION INDIV SUBSEQ: CPT | Mod: 95 | Performed by: DIETITIAN, REGISTERED

## 2025-03-28 NOTE — PROGRESS NOTES
Reason for Nutrition Visit:  Pt is a 39 y.o. female being seen at [facility]. Pt was referred by Preeti Garza, APRN-CNP effective 8.31.24.      1. Class 2 severe obesity due to excess calories with serious comorbidity and body mass index (BMI) of 39.0 to 39.9 in adult        2. Mixed hyperlipidemia        3. BMI 39.0-39.9,adult           Past Medical Hx:  Patient Active Problem List   Diagnosis    Acid reflux    Anxiety    Benign essential hypertension    Insomnia    Mixed hyperlipidemia    Nodule of middle lobe of right lung    Seasonal allergies    Splenomegaly    Routine general medical examination at a health care facility    Encounter for screening mammogram for malignant neoplasm of breast    BMI 39.0-39.9,adult    Class 2 severe obesity due to excess calories with serious comorbidity and body mass index (BMI) of 39.0 to 39.9 in adult        Current Outpatient Medications:     ALPRAZolam (Xanax) 0.25 mg tablet, Take 1 tablet (0.25 mg) by mouth 3 times a day as needed for anxiety., Disp: 30 tablet, Rfl: 1    calcium acetate 668 mg (169 mg calcium) tablet, Take 500 mg by mouth., Disp: , Rfl:     losartan (Cozaar) 100 mg tablet, TAKE 1 TABLET BY MOUTH EVERY DAY, Disp: 90 tablet, Rfl: 2    omega 3-dha-epa-fish oil (Fish OiL) 1,000 mg (120 mg-180 mg) capsule, Take 2 capsules (2,000 mg) by mouth 2 times a day., Disp: 120 capsule, Rfl: 11    tirzepatide, weight loss, (Zepbound) 5 mg/0.5 mL injection, Inject 5 mg under the skin every 7 days., Disp: 2 mL, Rfl: 5    traZODone (Desyrel) 50 mg tablet, Take 0.5-2 tablets ( mg) by mouth as needed at bedtime for sleep., Disp: 60 tablet, Rfl: 5    vitamin D3-vit K1-vit MK4-MK7 -1,500 mcg capsule, Take by mouth., Disp: , Rfl:      Anthropometrics:      Weight change:    Significant Weight Change: No   lb (79 kg)   10/2024 Weight: 278 lbs   11/2024 Weight: 273 lbs   12/2024 Weight: 261.5 lbs   01/2025 Weight: 257.5  02/2025 Weight: 250 lbs  03/2025 Weight:  245 lbs      Lab Results   Component Value Date    CHOL 259 (H) 08/29/2024    LDLF 102 (H) 02/16/2022    TRIG 460 (H) 08/29/2024    HDL 38.7 08/29/2024          Chemistry    Lab Results   Component Value Date/Time     09/03/2021 0830    K 4.1 09/03/2021 0830     09/03/2021 0830    CO2 26 09/03/2021 0830    BUN 10 09/03/2021 0830    CREATININE 0.91 09/03/2021 0830    Lab Results   Component Value Date/Time    CALCIUM 10.0 09/03/2021 0830    ALKPHOS 100 09/03/2021 0830    AST 19 09/03/2021 0830    ALT 20 09/03/2021 0830    BILITOT 0.7 09/03/2021 0830         Food and Nutrition Hx:  Pt states she has a hx of restrictive eating. She does at times  She has started on Zepbound. She is planning on going on a keto diet to  [employer].   Pt wakes up feeling okay. Pt wakes up at 6:00 am. 3 meals are consumed per day.     Pt had a follow-up appointment.   Pt states she has been still taking Zepbound at the 5. She keeps the food noise down. She does feel hunger at times.  Exercise is 1-2 x a week.   Meditations 1-2 x a week.  I overate after feeling “I just don’t care.” 3- several times a week     Pt wakes up at 6:00 am.     24 Diet Recall:  Meal 1: Breakfast is at 7:00- 8:00 to include Orgain Nutrition Drink (kcal 230, CHO 23. protein 20)  Meal 2: Lunch is at 11:00- 1:00 to include a sandwich with 2 slices of wheat bread with meat and cheese or salad 2 -3 cups of lettuce, 3 ounces of chicken and fruit or salad with lettuce, 0.5 apples, chicken cheese, and light dressing.   Pt energy drops in the afternoon. A snack is now consumed at 4:00 to include cheese and crackers (kcal 150-200, CHO 15, protein 7)   Meal 3: Dinner is between 6:00- 7:00 pm to include a home  meal or 3 -6 ounces of fish, 2 cups of vegetables such as broccoli, and 1 cup of potato or 0.25 cup of rice.    Snacks: no bedtime snack. She can consume another snack.   Beverages: 64 ounces water and coffee with fat free milk with syrup.  CHO intake  "may be lower and may effect sleep.       Allergies: None  Intolerance: None  Appetite: Good  Intake: >75%  GI Symptoms : bloating and constipation Frequency: frequent  Swallowing Difficulty: No problems with swallowing  Dentition : own    Types of Activities: House/Yard Work  Exercise 2 -3 times a week for 30 minutes of walking.     Sleep duration/quality : 7+ hours and disrupted sleepPt sleep between 10:00-6:00 am.   Sleep disorders: none    Supplements: Vitamin D, Calcium, and Fish Oil daily    Feelings of Hunger?: Yes and will eat  Physical Feeling: Does not feel fullness and Physically full  Binging: Never  Cravings: Starchy  Energy Levels: Fluctuates    Food Preparation: Patient  Cooking Skills/Barriers: None reported  Grocery Shopping: Patient    Nutrition Focused Physical Exam:    Performed/Deferred: Deferred as pt visually appears well-nourished with no signs of malnutrition    Estimated Energy Needs:  Energy Needs   Calculated Energy Needs Using Equations  Height: 172.7 cm (5' 7.99\")  Weight Used for Equation Calculations: 124 kg (273 lb 5.9 oz)  Vernell Chew Equation (Overweight or Obese Patients): 1963  Equation Chosen to Use by RD: Isidro Omer  Activity Factor: 1.3  Total Energy Needs: 2550  Estimated Energy Needs  Total Energy Estimated Needs (kCal): 2000 kCal    Nutrition Diagnosis:  Nutrition Diagnosis     Patient has Nutrition Diagnosis Yes   Diagnosis Status (1) Active   Nutrition Diagnosis 1 Food and nutrition related knowledge deficit   Related to (1) how to eat for weight loss   As Evidenced by (1) reports by pt of the need to learn.   Additional Nutrition Diagnosis Diagnosis 2   Diagnosis Status (2) Active   Nutrition Diagnosis 2 Physical inactivity   Related to (2) sedentary lifestyle   As Evidenced by (2) reported PAL level of 1.2-1.3.   Diagnosis Status (3) New   Nutrition Diagnosis 3 Self monitoring deficit   Related to (3) a struggle with stopping eating when the body is physically " full ~2-3 times a week   As Evidenced by (3) reports by pt of overating after feeling “I just don’t care.”     Nutrition Interventions:  Medical nutrition therapy was given for wt loss, HLD, and HTN.     Nutrition Prescription:  1,500- 2,000 calories per day for 1 -2 lb wt loss per week. Adequate protein intake of 79 grams per day. Heart healthy meal plan with a low saturated fat intake to <5 -6 % of energy (less than 11 grams of saturated fat per day), reduced intake of added sugars (<10% of total energy), 25 grams of fiber with intake of viscous fiber to 5 g/day to 10 g/day, plant sterols/stanols to 2 g/day, 1.1 gram of omega three fatty acids, and a 1,500 mg sodium restriction.Increase response to hunger, taste satiety, physical fullness, and satiety.  Aim to discover food selections, amounts, and pattern to nourish the body and mind to improve energy, physical and mental stamina, contentment, and sometimes even sleep.     Nutrition Education     Nutrition Education Application Nutrition related skill education   Goals Mindfulness Eating Exercise-Mindful Eating brings attention and awareness while we eat. It includes slowing down to be present to touch, see, smell, and taste the food while acknowledging any thoughts or feelings about the food. Practicing eating in this manner can allow us to be more like food connoisseurs where we examine all these different elements of eating to not only enjoy the food but discover if you even want to continue to eat the food. The Mindful Eating meditation can help to think about eating food in this Mindful Eating manner so that when we eat, there can be more intention to discover these elements mindfully of the food. The goals are to listen to mindful eating meditation 6 days a week and incorporate mindful eating practices at least one a day at a meal.   Nutrition Counseling     Nutrition Counseling Strategies Nutrition counseling based on cognitive restructuring strategy    Goals Mindfulness Based Eating Awarness Training     Nutrition Monitoring and Evaluation    Knowledge Belief Attitude Determination     Monitoring and Evaluation Plan Food and nutrition knowledge; Physical activity   Food and nutrition knowledge Nutrition knowledge of individual client   Criteria mindfully eat at one meal a day, routine meditation for 5 days a week, and reduce occurrence of overeating (current pattern is 3 times a week)   Physical activity Consistency; Duration   Criteria 3 -4 times a week for 30 minutes   Anthropometric measurements     Monitoring and Evaluation Plan Weight       Keep in Balance  I overate after feeling “I just don’t care.” 3- several times a week   I  had a “treat” without over-eating. 3- several times a week  I engaged in aerobic exercise for at least 20 minutes. 3- several times a week    Nutrition Goals:  Via teach back method patient verbalized understanding of the following topics:  1) Aim to exercise 3 -4 times a week for 30 minutes.   2) Mindful eating meditation and practice-The goals are to listen to mindful eating meditation 6 days a week.   3) Incorporate mindful eating practices at least one a day at a meal.     Educational Handouts/Practices:  mindfully eating raisjayleen   Mindfulness of the Breath, Chattering Mind, Keep in Balance Assessment  Exercise for Weight loss recommendation  Plate Method (2022)   Inner/Outer [place], High Protein   Next session: Mindfulness-  Hunger Awareness and Practice     Amanda Bonner, MS, RDN, LD, MARCO A, MB-EAT-P  Advanced Practice Clinical Dietitian   Mindfulness-Based Eating Awareness Training Practitioner  [facility]   [facility] [place]   [email or web address]  Scheduling Line [phone]  Direct Line [phone]    Readiness to Change : Good  Level of Understanding : Good  Anticipated Compliant : Good

## 2025-04-21 ENCOUNTER — TELEMEDICINE CLINICAL SUPPORT (OUTPATIENT)
Dept: NUTRITION | Facility: CLINIC | Age: 40
End: 2025-04-21
Payer: COMMERCIAL

## 2025-04-21 DIAGNOSIS — E78.2 MIXED HYPERLIPIDEMIA: ICD-10-CM

## 2025-04-21 DIAGNOSIS — E66.01 CLASS 2 SEVERE OBESITY DUE TO EXCESS CALORIES WITH SERIOUS COMORBIDITY AND BODY MASS INDEX (BMI) OF 39.0 TO 39.9 IN ADULT: Primary | ICD-10-CM

## 2025-04-21 DIAGNOSIS — E66.812 CLASS 2 SEVERE OBESITY DUE TO EXCESS CALORIES WITH SERIOUS COMORBIDITY AND BODY MASS INDEX (BMI) OF 39.0 TO 39.9 IN ADULT: Primary | ICD-10-CM

## 2025-04-21 PROCEDURE — 97803 MED NUTRITION INDIV SUBSEQ: CPT | Mod: 95 | Performed by: DIETITIAN, REGISTERED

## 2025-04-21 NOTE — PROGRESS NOTES
Reason for Nutrition Visit:  Pt is a 39 y.o. female being seen at [facility]. Pt was referred by Preeti Garza, APRN-CNP effective 8.31.24.      1. Class 2 severe obesity due to excess calories with serious comorbidity and body mass index (BMI) of 39.0 to 39.9 in adult        2. Mixed hyperlipidemia        3. BMI 39.0-39.9,adult           Past Medical Hx:  Patient Active Problem List   Diagnosis    Acid reflux    Anxiety    Benign essential hypertension    Insomnia    Mixed hyperlipidemia    Nodule of middle lobe of right lung    Seasonal allergies    Splenomegaly    Routine general medical examination at a health care facility    Encounter for screening mammogram for malignant neoplasm of breast    BMI 39.0-39.9,adult    Class 2 severe obesity due to excess calories with serious comorbidity and body mass index (BMI) of 39.0 to 39.9 in adult        Current Outpatient Medications:     ALPRAZolam (Xanax) 0.25 mg tablet, Take 1 tablet (0.25 mg) by mouth 3 times a day as needed for anxiety., Disp: 30 tablet, Rfl: 1    calcium acetate 668 mg (169 mg calcium) tablet, Take 500 mg by mouth., Disp: , Rfl:     losartan (Cozaar) 100 mg tablet, TAKE 1 TABLET BY MOUTH EVERY DAY, Disp: 90 tablet, Rfl: 2    omega 3-dha-epa-fish oil (Fish OiL) 1,000 mg (120 mg-180 mg) capsule, Take 2 capsules (2,000 mg) by mouth 2 times a day., Disp: 120 capsule, Rfl: 11    tirzepatide, weight loss, (Zepbound) 5 mg/0.5 mL injection, Inject 5 mg under the skin every 7 days., Disp: 2 mL, Rfl: 5    traZODone (Desyrel) 50 mg tablet, Take 0.5-2 tablets ( mg) by mouth as needed at bedtime for sleep., Disp: 60 tablet, Rfl: 5    vitamin D3-vit K1-vit MK4-MK7 -1,500 mcg capsule, Take by mouth., Disp: , Rfl:      Anthropometrics:      Weight change:    Significant Weight Change: No   lb (79 kg)   10/2024 Weight: 278 lbs   11/2024 Weight: 273 lbs   12/2024 Weight: 261.5 lbs   01/2025 Weight: 257.5  02/2025 Weight: 250 lbs  03/2025 Weight:  245 lbs   04/2025 Weight: 241-245 lbs      Lab Results   Component Value Date    CHOL 259 (H) 08/29/2024    LDLF 102 (H) 02/16/2022    TRIG 460 (H) 08/29/2024    HDL 38.7 08/29/2024          Chemistry    Lab Results   Component Value Date/Time     09/03/2021 0830    K 4.1 09/03/2021 0830     09/03/2021 0830    CO2 26 09/03/2021 0830    BUN 10 09/03/2021 0830    CREATININE 0.91 09/03/2021 0830    Lab Results   Component Value Date/Time    CALCIUM 10.0 09/03/2021 0830    ALKPHOS 100 09/03/2021 0830    AST 19 09/03/2021 0830    ALT 20 09/03/2021 0830    BILITOT 0.7 09/03/2021 0830         Food and Nutrition Hx:  Pt states she has a hx of restrictive eating. She does at times  She has started on Zepbound. She is planning on going on a keto diet to  [place].   Pt wakes up feeling okay. Pt wakes up at 6:00 am. 3 meals are consumed per day.   From the Keep in Balance Assessment -I overate after feeling “I just don’t care.” 3- several times a week     Pt had a follow-up appointment.   Pt states she has been still taking Zepbound at the 5. She keeps the food noise down. She does feel hunger at times. She also reports she has been having more issues with her gut. The gut symptoms are correlated with ZEPBOUND. She is trying to consume a bland diet. She reports watery diarrhea, gas, and burping.   She has diarrhea 4-5 times a day with type 7 stools. She also has gas.   She has been drinking liquid IV.   Her GI symptoms have been deterred her nutrition goals of mindfulness and physical activity.   Exercise is 1-2 x a week.   Meditations 0-1 x a week.    Pt wakes up at 6:00 am.     24 Diet Recall:  Meal 1: Breakfast is at 7:00- 8:00 to include Orgain Nutrition Drink (kcal 160, CHO 7. protein 20). There is no inulin.   Snack Cheddar or Mozzarella cheese and Ritz crackers (gluten)   Meal 2: Lunch is at 11:00- 1:00 to include salad 2 -3 cups of lettuce, 3 ounces of chicken, cxorn and beans with home-made dressing  "made with honey, garlic, and other ingredients or another Orgain Drink.    Pt energy drops in the afternoon. A snack is now consumed at 4:00 to include cheese and crackers (kcal 150-200, CHO 15, protein 7)   Meal 3: Dinner is between 6:00- 7:00 pm to include a home  meal or 3 -6 ounces of fish, 2 cups of vegetables such as broccoli, and 1 cup of potato or 0.25 cup of rice.    Snacks: no bedtime snack. She can consume another snack.   Beverages: 64 ounces water and coffee with fat free milk with syrup.  CHO intake may be lower and may effect sleep.       Allergies: None  Intolerance: None  Appetite: Good  Intake: >75%  GI Symptoms : bloating and constipation Frequency: frequent  Swallowing Difficulty: No problems with swallowing  Dentition : own    Types of Activities: House/Yard Work  Exercise 2 -3 times a week for 30 minutes of walking.     Sleep duration/quality : 7+ hours and disrupted sleepPt sleep between 10:00-6:00 am.   Sleep disorders: none    Supplements: Vitamin D, Calcium, and Fish Oil daily    Feelings of Hunger?: Yes and will eat  Physical Feeling: Does not feel fullness and Physically full  Binging: Never  Cravings: Starchy  Energy Levels: Fluctuates    Food Preparation: Patient  Cooking Skills/Barriers: None reported  Grocery Shopping: Patient    Nutrition Focused Physical Exam:    Performed/Deferred: Deferred as pt visually appears well-nourished with no signs of malnutrition    Estimated Energy Needs:  Energy Needs   Calculated Energy Needs Using Equations  Height: 172.7 cm (5' 7.99\")  Weight Used for Equation Calculations: 124 kg (273 lb 5.9 oz)  Vernell Chew Equation (Overweight or Obese Patients): 1963  Equation Chosen to Use by RD: Isidro Omer  Activity Factor: 1.3  Total Energy Needs: 2550  Estimated Energy Needs  Total Energy Estimated Needs (kCal): 2000 kCal    Nutrition Diagnosis:    Nutrition Diagnosis     Patient has Nutrition Diagnosis Yes   Diagnosis Status (1) Active "   Nutrition Diagnosis 1 Food and nutrition related knowledge deficit   Related to (1) how to eat for weight loss   As Evidenced by (1) reports by pt of the need to learn.   Additional Nutrition Diagnosis Diagnosis 2   Diagnosis Status (2) Active   Nutrition Diagnosis 2 Physical inactivity   Related to (2) sedentary lifestyle   As Evidenced by (2) reported PAL level of 1.2-1.3.   Diagnosis Status (3) Active   Nutrition Diagnosis 3 Self monitoring deficit   Related to (3) a struggle with stopping eating when the body is physically full ~2-3 times a week   As Evidenced by (3) reports by pt of overating after feeling “I just don’t care.”     Diagnosis Status: New  Diagnosis 4: Altered  function related to weight loss medication that is impeding nutrition goals for weight loss as evidenced by reports by pt of daily diarrhea and gas.     Nutrition Interventions:  Medical nutrition therapy was given for wt loss, HLD, and HTN.     Nutrition Prescription:  1,500- 2,000 calories per day for 1 -2 lb wt loss per week. Adequate protein intake of 79 grams per day. Heart healthy meal plan with a low saturated fat intake to <5 -6 % of energy (less than 11 grams of saturated fat per day), reduced intake of added sugars (<10% of total energy), 25 grams of fiber with intake of viscous fiber to 5 g/day to 10 g/day, plant sterols/stanols to 2 g/day, 1.1 gram of omega three fatty acids, and a 1,500 mg sodium restriction.Increase response to hunger, taste satiety, physical fullness, and satiety.  Aim to discover food selections, amounts, and pattern to nourish the body and mind to improve energy, physical and mental stamina, contentment, and sometimes even sleep. Improve GI symptoms from weight loss medication.     Food and Nutrition Delivery     Meals & Snacks Modify composition of oral intake   Goals FODMAP is an acronym for fermentable oligosaccharides, disaccharides, monosaccharides, and polyols--categories of short-chain  carbohydrates that are poorly absorbed in the small intestine and may lead to digestive discomfort in certain individuals. These carbohydrates, which include sugars, starches, and fibers, are naturally present in various foods and food additives.     When FODMAPs reach the large intestine, they undergo fermentation by gut bacteria, resulting in the production of gas and other byproducts. This process can lead to symptoms such as bloating, gas, abdominal discomfort, and altered bowel habits in sensitive individuals.     A low FODMAP diet is a structured dietary approach designed to alleviate digestive symptoms by reducing or eliminating high-FODMAP foods. Commonly recommended for individuals with irritable bowel syndrome (IBS) or similar digestive conditions, this diet involves two key phases: an elimination phase to minimize symptom triggers, followed by a systematic reintroduction phase to identify specific food sensitivities and individual tolerance levels.   Nutrition Education     Nutrition Education Content Physical activity guidance   Nutrition Education Application Nutrition related skill education   Goals Mindfulness Eating Exercise-Mindful Eating brings attention and awareness while we eat. It includes slowing down to be present to touch, see, smell, and taste the food while acknowledging any thoughts or feelings about the food. Practicing eating in this manner can allow us to be more like food connoisseurs where we examine all these different elements of eating to not only enjoy the food but discover if you even want to continue to eat the food. The Mindful Eating meditation can help to think about eating food in this Mindful Eating manner so that when we eat, there can be more intention to discover these elements mindfully of the food. The goals are to listen to mindful eating meditation 6 days a week and incorporate mindful eating practices at least one a day at a meal.   Nutrition Counseling      Nutrition Counseling Strategies Nutrition counseling based on cognitive restructuring strategy   Goals Mindfulness Based Eating Awarness Training-         Nutrition Monitoring and Evaluation    Knowledge Belief Attitude Determination     Monitoring and Evaluation Plan Food and nutrition knowledge; Physical activity   Food and nutrition knowledge Nutrition knowledge of individual client   Criteria mindfully eat at one meal a day, routine meditation for 5 days a week, and reduce occurrence of overeating (current pattern is 3 times a week)   Physical activity Consistency; Duration   Criteria 3 -4 times a week for 30 minutes   Anthropometric measurements     Monitoring and Evaluation Plan Weight       Keep in Balance  I overate after feeling “I just don’t care.” 3- several times a week   I  had a “treat” without over-eating. 3- several times a week  I engaged in aerobic exercise for at least 20 minutes. 3- several times a week    Nutrition Goals:  Via teach back method patient verbalized understanding of the following topics:  1) Aim to exercise 3 -4 times a week for 30 minutes.   2) Mindful eating meditation and practice-The goals are to listen to mindful eating meditation 6 days a week.   3) Incorporate mindful eating practices at least one a day at a meal.   4) Strive to follow a low FODMAP diet for the next 2 weeks selecting foods low in FODMAP. After the next few weeks slow reintroduce foods higher in F    Educational Handouts/Practices: FODMAP   Mindfulness: mindfully eating raisins   Mindfulness of the Breath, Chattering Mind, Keep in Balance Assessment  Exercise for Weight loss recommendation  Plate Method (2022)   Inner/Outer [place], High Protein   Next session: Mindfulness-  Hunger Awareness and Practice     Amanda Bonner, MS, RDN, LD, MARCO A, MB-EAT-P  Advanced Practice Clinical Dietitian   Mindfulness-Based Eating Awareness Training Practitioner  [facility]   Digestive  Health [place]   [email or web address]  Scheduling Line [phone]  Direct Line [phone]    Readiness to Change : Good  Level of Understanding : Good  Anticipated Compliant : Good

## 2025-04-28 ENCOUNTER — APPOINTMENT (OUTPATIENT)
Dept: PRIMARY CARE | Facility: CLINIC | Age: 40
End: 2025-04-28
Payer: COMMERCIAL

## 2025-05-21 ENCOUNTER — APPOINTMENT (OUTPATIENT)
Dept: NUTRITION | Facility: CLINIC | Age: 40
End: 2025-05-21
Payer: COMMERCIAL

## 2025-05-21 DIAGNOSIS — I10 BENIGN ESSENTIAL HYPERTENSION: ICD-10-CM

## 2025-05-21 DIAGNOSIS — E66.01 CLASS 2 SEVERE OBESITY DUE TO EXCESS CALORIES WITH SERIOUS COMORBIDITY AND BODY MASS INDEX (BMI) OF 39.0 TO 39.9 IN ADULT: Primary | ICD-10-CM

## 2025-05-21 DIAGNOSIS — E66.812 CLASS 2 SEVERE OBESITY DUE TO EXCESS CALORIES WITH SERIOUS COMORBIDITY AND BODY MASS INDEX (BMI) OF 39.0 TO 39.9 IN ADULT: Primary | ICD-10-CM

## 2025-05-21 DIAGNOSIS — E78.2 MIXED HYPERLIPIDEMIA: ICD-10-CM

## 2025-05-21 PROCEDURE — 97803 MED NUTRITION INDIV SUBSEQ: CPT | Performed by: DIETITIAN, REGISTERED

## 2025-05-21 RX ORDER — LOSARTAN POTASSIUM 100 MG/1
100 TABLET ORAL DAILY
Qty: 90 TABLET | Refills: 2 | Status: SHIPPED | OUTPATIENT
Start: 2025-05-21

## 2025-05-21 NOTE — PROGRESS NOTES
Reason for Nutrition Visit:  Pt is a 40 y.o. female being seen at [facility]. Pt was referred by Preeti Garza, APRN-CNP effective 8.31.24.      1. Class 2 severe obesity due to excess calories with serious comorbidity and body mass index (BMI) of 39.0 to 39.9 in adult        2. Mixed hyperlipidemia        3. BMI 39.0-39.9,adult              Past Medical Hx:  Patient Active Problem List   Diagnosis    Acid reflux    Anxiety    Benign essential hypertension    Insomnia    Mixed hyperlipidemia    Nodule of middle lobe of right lung    Seasonal allergies    Splenomegaly    Routine general medical examination at a health care facility    Encounter for screening mammogram for malignant neoplasm of breast    BMI 39.0-39.9,adult    Class 2 severe obesity due to excess calories with serious comorbidity and body mass index (BMI) of 39.0 to 39.9 in adult        Current Outpatient Medications:     ALPRAZolam (Xanax) 0.25 mg tablet, Take 1 tablet (0.25 mg) by mouth 3 times a day as needed for anxiety., Disp: 30 tablet, Rfl: 1    calcium acetate 668 mg (169 mg calcium) tablet, Take 500 mg by mouth., Disp: , Rfl:     losartan (Cozaar) 100 mg tablet, TAKE 1 TABLET BY MOUTH EVERY DAY, Disp: 90 tablet, Rfl: 2    omega 3-dha-epa-fish oil (Fish OiL) 1,000 mg (120 mg-180 mg) capsule, Take 2 capsules (2,000 mg) by mouth 2 times a day., Disp: 120 capsule, Rfl: 11    tirzepatide, weight loss, (Zepbound) 5 mg/0.5 mL injection, Inject 5 mg under the skin every 7 days., Disp: 2 mL, Rfl: 5    traZODone (Desyrel) 50 mg tablet, Take 0.5-2 tablets ( mg) by mouth as needed at bedtime for sleep., Disp: 60 tablet, Rfl: 5    vitamin D3-vit K1-vit MK4-MK7 -1,500 mcg capsule, Take by mouth., Disp: , Rfl:      Anthropometrics:      Weight change:    Significant Weight Change: No   lb (79 kg)   10/2024 Weight: 278 lbs   11/2024 Weight: 273 lbs   12/2024 Weight: 261.5 lbs   01/2025 Weight: 257.5  02/2025 Weight: 250 lbs  03/2025  Weight: 245 lbs   04/2025 Weight: 241-245 lbs    05/2025 Weight: 242 lbs    Lab Results   Component Value Date    CHOL 259 (H) 08/29/2024    LDLF 102 (H) 02/16/2022    TRIG 460 (H) 08/29/2024    HDL 38.7 08/29/2024          Chemistry    Lab Results   Component Value Date/Time     09/03/2021 0830    K 4.1 09/03/2021 0830     09/03/2021 0830    CO2 26 09/03/2021 0830    BUN 10 09/03/2021 0830    CREATININE 0.91 09/03/2021 0830    Lab Results   Component Value Date/Time    CALCIUM 10.0 09/03/2021 0830    ALKPHOS 100 09/03/2021 0830    AST 19 09/03/2021 0830    ALT 20 09/03/2021 0830    BILITOT 0.7 09/03/2021 0830         Food and Nutrition Hx:  Pt states she has a hx of restrictive eating. She does at times  She has started on Zepbound. She is planning on going on a keto diet to  [place].   Pt wakes up feeling okay. Pt wakes up at 6:00 am. 3 meals are consumed per day.   From the Keep in Balance Assessment -I overate after feeling “I just don’t care.” 3- several times a week     Pt had a follow-up appointment.   Pt states she does not have any issues with her gut anymore. She had tried the low FODMAP and GI issues has resolved. She [facility] GPL[facility]   Exercise is 1-2 x a week for 30 minutes.   Meditations 0-1 x a week.    Pt wakes up at 6:00 am.     24 Diet Recall:  Meal 1: Breakfast is at 7:00- 8:00 to include Orgain Nutrition Drink (kcal 160, CHO 7. protein 20). There is no inulin.   Snack Cheddar or Mozzarella cheese and Ritz crackers (gluten)   Meal 2: Lunch is at 11:00- 1:00 to include salad 2 -3 cups of lettuce, 3 ounces of chicken, cxorn and beans with home-made dressing made with honey, garlic, and other ingredients or another Orgain Drink.    Pt energy drops in the afternoon. A snack is now consumed at 4:00 to include cheese and crackers (kcal 150-200, CHO 15, protein 7)   Meal 3: Dinner is between 6:00- 7:00 pm to include a home  meal or 3 -6 ounces of fish, 2 cups of  "vegetables such as broccoli, and 1 cup of potato or 0.25 cup of rice.    Snacks: no bedtime snack. She can consume another snack.   Beverages: 64 ounces water and coffee with fat free milk with syrup.  CHO intake may be lower and may effect sleep.       Allergies: None  Intolerance: None  Appetite: Good  Intake: >75%  GI Symptoms : bloating and constipation Frequency: frequent  Swallowing Difficulty: No problems with swallowing  Dentition : own    Types of Activities: House/Yard Work  Exercise 2 -3 times a week for 30 minutes of walking.     Sleep duration/quality : 7+ hours and disrupted sleepPt sleep between 10:00-6:00 am.   Sleep disorders: none    Supplements: Vitamin D, Calcium, and Fish Oil daily        Physical Feeling: Does not feel fullness and Physically full  Binging: Never  Cravings: Starchy  Energy Levels: Fluctuates    Food Preparation: Patient  Cooking Skills/Barriers: None reported  Grocery Shopping: Patient    Nutrition Focused Physical Exam:    Performed/Deferred: Deferred as pt visually appears well-nourished with no signs of malnutrition    Estimated Energy Needs:  Energy Needs   Calculated Energy Needs Using Equations  Height: 172.7 cm (5' 7.99\")  Weight Used for Equation Calculations: 124 kg (273 lb 5.9 oz)  Vernell Chew Equation (Overweight or Obese Patients): 1963  Equation Chosen to Use by RD: Isidro Omer  Activity Factor: 1.3  Total Energy Needs: 2550  Estimated Energy Needs  Total Energy Estimated Needs (kCal): 2000 kCal    Nutrition Diagnosis:    Nutrition Diagnosis     Patient has Nutrition Diagnosis Yes   Diagnosis Status (1) Active   Nutrition Diagnosis 1 Food and nutrition related knowledge deficit   Related to (1) how to eat for weight loss   As Evidenced by (1) reports by pt of the need to learn.   Additional Nutrition Diagnosis Diagnosis 2   Diagnosis Status (2) Active   Nutrition Diagnosis 2 Physical inactivity   Related to (2) sedentary lifestyle   As Evidenced by (2) " reported PAL level of 1.2-1.3.   Diagnosis Status (3) Active   Nutrition Diagnosis 3 Self monitoring deficit   Related to (3) a struggle with stopping eating when the body is physically full ~2-3 times a week   As Evidenced by (3) reports by pt of overating after feeling “I just don’t care.”     Diagnosis Status: New  Diagnosis 4: Altered  function related to weight loss medication that is impeding nutrition goals for weight loss as evidenced by reports by pt of daily diarrhea and gas.     Nutrition Interventions:  Medical nutrition therapy was given for wt loss, HLD, and HTN.     Nutrition Prescription:  1,500- 2,000 calories per day for 1 -2 lb wt loss per week. Adequate protein intake of 79 grams per day. Heart healthy meal plan with a low saturated fat intake to <5 -6 % of energy (less than 11 grams of saturated fat per day), reduced intake of added sugars (<10% of total energy), 25 grams of fiber with intake of viscous fiber to 5 g/day to 10 g/day, plant sterols/stanols to 2 g/day, 1.1 gram of omega three fatty acids, and a 1,500 mg sodium restriction.Increase response to hunger, taste satiety, physical fullness, and satiety.  Aim to discover food selections, amounts, and pattern to nourish the body and mind to improve energy, physical and mental stamina, contentment, and sometimes even sleep. Improve GI symptoms from weight loss medication.     Nutrition Prescription     Individualized Nutrition Prescription Provided for Oral nutrition   Nutrition Education     Nutrition Education Content Physical activity guidance   Goals exercise recommendation for health   Nutrition Counseling     Nutrition Counseling Strategies Nutrition counseling based on cognitive restructuring strategy   Goals Mindfulness Baesd Eating Awarness Training-The hunger awareness meditation can help us identify when we are physically hungry. A hunger scale was also created to help us identify when we need to eat. Try to eat mostly when you  are physically hungry, noticing the differences between feelings of physical hunger, and other reasons or triggers for eating -- just because food is available, the clock says it’s time to eat, or perhaps because you’re bored or procrastinating! Is it easier to notice physical hunger during the day or during the evening? When you are alone or with others? Notice what makes it easier or harder to pay attention to physical hunger.       Nutrition Monitoring and Evaluation    Knowledge Belief Attitude Determination     Monitoring and Evaluation Plan Food and nutrition knowledge; Physical activity   Criteria reduce occurrence of overeating (current pattern is 3 times a week) and increase awarness of moderate hungry while on a wt loss medication   Physical activity Consistency   Anthropometric measurements     Monitoring and Evaluation Plan Weight       Keep in Balance  I overate after feeling “I just don’t care.” 3- several times a week   I  had a “treat” without over-eating. 3- several times a week  I engaged in aerobic exercise for at least 20 minutes. 3- several times a week    Nutrition Goals:  Via teach back method patient verbalized understanding of the following topics:  1) A hunger awareness meditation and exercise were given today. It is recommended to listen to this meditation six days a week to help increase mindfulness to hunger cues. Use the hunger scale to help recognize and respond to a moderate hunger level, observe when food is consumed without the presence of hunger, and recognize when we have become too hungry.   2) The recommendation for exercise and health is to participate in 150 minutes of moderate exercise per week. Consider walking 5 days a week for 30 minutes or more. Moderate exercise means that there is an increase in heart rate, yet a conversation could still be participated in. If walking for this time is not feasible then consider use of pedometer with aim for 7,000- 10,000 steps to meet  this exercise recommendation.      Educational Handouts/Practices: Mindfulness-  Hunger Awareness and Practice   Mindfulness: mindfully eating raisins   Mindfulness of the Breath, Chattering Mind, Keep in Balance Assessment  Exercise for Weight loss recommendation  Plate Method (2022)   Inner/Outer [place], High Protein   Next session: Taste Satiety     Amanda Bonner, MS, RDN, LD, MARCO A, MB-EAT-P  Advanced Practice Clinical Dietitian   Mindfulness-Based Eating Awareness Training Practitioner  [facility]   Digestive Health [place]   [email or web address]  Scheduling Line [phone]  Direct Line [phone]    Readiness to Change : Good  Level of Understanding : Good  Anticipated Compliant : Good

## 2025-05-28 ASSESSMENT — ENCOUNTER SYMPTOMS
APPETITE CHANGE: 0
POLYPHAGIA: 0
SORE THROAT: 0
EYE REDNESS: 0
NECK PAIN: 0
ADENOPATHY: 0
POLYDIPSIA: 0
FATIGUE: 0
BRUISES/BLEEDS EASILY: 0
CONFUSION: 0
EYE DISCHARGE: 0
FEVER: 0
CHILLS: 0
SHORTNESS OF BREATH: 0
BACK PAIN: 0
HEADACHES: 0
FREQUENCY: 0
HEMATURIA: 0
ABDOMINAL PAIN: 0
VOMITING: 0
DYSURIA: 0
EYE PAIN: 0
UNEXPECTED WEIGHT CHANGE: 0
PALPITATIONS: 0
HALLUCINATIONS: 0
TROUBLE SWALLOWING: 0
WOUND: 0
BLOOD IN STOOL: 0
COUGH: 0
DIZZINESS: 0

## 2025-05-28 ASSESSMENT — PROMIS GLOBAL HEALTH SCALE
RATE_AVERAGE_PAIN: 0
CARRYOUT_SOCIAL_ACTIVITIES: GOOD
RATE_GENERAL_HEALTH: GOOD
RATE_MENTAL_HEALTH: GOOD
EMOTIONAL_PROBLEMS: OFTEN
RATE_PHYSICAL_HEALTH: GOOD
RATE_AVERAGE_FATIGUE: MODERATE
CARRYOUT_PHYSICAL_ACTIVITIES: COMPLETELY
RATE_QUALITY_OF_LIFE: GOOD
RATE_SOCIAL_SATISFACTION: GOOD

## 2025-05-28 NOTE — PROGRESS NOTES
Subjective   Patient ID: Jeanne Myers is a 40 y.o. female who presents for Annual Exam.      Is pt fasting?  No   Does pt see any providers other than below?  Solo derm, therapist, nutritionist-sigrid    Does pt want pap today?  No   Does pt want me to refer her to ob gyn for a pap? No   Bps at home -  does not do   Is pt taking fish oil 2 bid?   Yes   Last xanax use-  yesterday   Does pt need a refill? No   If not will she need a refill in the next 6mon? No   If she needs a refill or needs a refill in the next 6mon, do uds and csa  Is pt still taking zepbound?  Yes, but switching to wegovy   If yes, does she still want to stay at 5mg or go up to 7.5mg? No   Is pt taking vit d otc?  Yes   If yes, how many international units? Does not know   Any concerns pt has today? No     Phq9=5   ,gad7=8      No care team member to display    HPI  Last labs-1/2025 cmp nl, trigs 232, hdl 34, ldl 136, A1c 5.0  8/2024 Trigs very high 460. Goal <150. Last time it was 245. Decr carbs and sugars. Are you taking fish oil 2 twice a day consistently? If no, please restart and recheck cholesterol lab in  2mon. Let me know by sending me a message. Was not taking 2 bid  Ldl cannot be calculated with this lab when trigs are >400. I added on a direct calculation of the ldl so we should get this back today. Ldl 134  Hdl low at 38. Goal >50 for women. Incr exercise.  4/2024 Sugar (aka glucose), kidney function, liver function and electrolytes in the CMP (comprehensive metabolic panel) were normal.  CBC (complete blood count) was normal which looks at infection and anemia markers.  TSH (thyroid test) was normal.  Ldl not calculated because triglycerides are very high at 488. Goal <150. Are you taking fish oil 2 twice a day consistently? Please send me a message to let me know. Decr carbs and sugars.  Hdl is low at 37. Goal >50. Incr exercise.   Ldl is high at 139. Goal <100. Decr fats and incr fiber.   Due for labs- all        OARRS:  No data  recorded  I have personally reviewed the OARRS report for Xiomara Myers. I have considered the risks of abuse, dependence, addiction and diversion    Is the patient prescribed a combination of a benzodiazepine and opioid?  No    Last Urine Drug Screen / ordered today: no, no refill needed so will hold off  No results found for this or any previous visit (from the past 8760 hours).    N/A    Controlled Substance Agreement:  Date of the Last Agreement: no refill needed so will hold off  Reviewed Controlled Substance Agreement including but not limited to the benefits, risks, and alternatives to treatment with a Controlled Substance medication(s).    Benzodiazepines:  What is the patient's goal of therapy? Decr anxiety  Is this being achieved with current treatment? no    STEFAN-7:  No data recorded    Activities of Daily Living:   Is your overall impression that this patient is benefiting (symptom reduction outweighs side effects) from benzodiazepine therapy? Yes but only temporary, starting pt on daily med-fluoxetine    1. Physical Functioning: Same  2. Family Relationship: Same  3. Social Relationship: Same  4. Mood: Same  5. Sleep Patterns: Same  6. Overall Function: Same    Cholesterol   Date Value Ref Range Status   08/29/2024 259 (H) 0 - 199 mg/dL Final     Comment:           Age      Desirable   Borderline High   High     0-19 Y     0 - 169       170 - 199     >/= 200    20-24 Y     0 - 189       190 - 224     >/= 225         >24 Y     0 - 199       200 - 239     >/= 240   **All ranges are based on fasting samples. Specific   therapeutic targets will vary based on patient-specific   cardiac risk.    Pediatric guidelines reference:Pediatrics 2011, 128(S5).Adult guidelines reference: NCEP ATPIII Guidelines,PAO 2001, 258:2486-97    Venipuncture immediately after or during the administration of Metamizole may lead to falsely low results. Testing should be performed immediately prior to Metamizole dosing.    02/16/2022 192 0 - 199 mg/dL Final     Comment:     .      AGE      DESIRABLE   BORDERLINE HIGH   HIGH     0-19 Y     0 - 169       170 - 199     >/= 200    20-24 Y     0 - 189       190 - 224     >/= 225         >24 Y     0 - 199       200 - 239     >/= 240   **All ranges are based on fasting samples. Specific   therapeutic targets will vary based on patient-specific   cardiac risk.  .   Pediatric guidelines reference:Pediatrics 2011, 128(S5).   Adult guidelines reference: NCEP ATPIII Guidelines,     PAO 2001, 258:2486-97  .   Venipuncture immediately after or during the    administration of Metamizole may lead to falsely   low results. Testing should be performed immediately   prior to Metamizole dosing.     09/03/2021 204 (H) 0 - 199 mg/dL Final     Comment:     .      AGE      DESIRABLE   BORDERLINE HIGH   HIGH     0-19 Y     0 - 169       170 - 199     >/= 200    20-24 Y     0 - 189       190 - 224     >/= 225         >24 Y     0 - 199       200 - 239     >/= 240   **All ranges are based on fasting samples. Specific   therapeutic targets will vary based on patient-specific   cardiac risk.  .   Pediatric guidelines reference:Pediatrics 2011, 128(S5).   Adult guidelines reference: NCEP ATPIII Guidelines,     PAO 2001, 258:2486-97  .   Venipuncture immediately after or during the    administration of Metamizole may lead to falsely   low results. Testing should be performed immediately   prior to Metamizole dosing.       Triglycerides   Date Value Ref Range Status   08/29/2024 460 (H) 0 - 149 mg/dL Final     Comment:        Age         Desirable   Borderline High   High     Very High   0 D-90 D    19 - 174         ----         ----        ----  91 D- 9 Y     0 -  74        75 -  99     >/= 100      ----    10-19 Y     0 -  89        90 - 129     >/= 130      ----    20-24 Y     0 - 114       115 - 149     >/= 150      ----         >24 Y     0 - 149       150 - 199    200- 499    >/= 500    Venipuncture  immediately after or during the administration of Metamizole may lead to falsely low results. Testing should be performed immediately prior to Metamizole dosing.   02/16/2022 245 (H) 0 - 149 mg/dL Final     Comment:     .      AGE      DESIRABLE   BORDERLINE HIGH   HIGH     VERY HIGH   0 D-90 D    19 - 174         ----         ----        ----  91 D- 9 Y     0 -  74        75 -  99     >/= 100      ----    10-19 Y     0 -  89        90 - 129     >/= 130      ----    20-24 Y     0 - 114       115 - 149     >/= 150      ----         >24 Y     0 - 149       150 - 199    200- 499    >/= 500  .   Venipuncture immediately after or during the    administration of Metamizole may lead to falsely   low results. Testing should be performed immediately   prior to Metamizole dosing.     09/03/2021 303 (H) 0 - 149 mg/dL Final     Comment:     .      AGE      DESIRABLE   BORDERLINE HIGH   HIGH     VERY HIGH   0 D-90 D    19 - 174         ----         ----        ----  91 D- 9 Y     0 -  74        75 -  99     >/= 100      ----    10-19 Y     0 -  89        90 - 129     >/= 130      ----    20-24 Y     0 - 114       115 - 149     >/= 150      ----         >24 Y     0 - 149       150 - 199    200- 499    >/= 500  .   Venipuncture immediately after or during the    administration of Metamizole may lead to falsely   low results. Testing should be performed immediately   prior to Metamizole dosing.       HDL-Cholesterol   Date Value Ref Range Status   08/29/2024 38.7 mg/dL Final     Comment:       Age       Very Low   Low     Normal    High    0-19 Y    < 35      < 40     40-45     ----  20-24 Y    ----     < 40      >45      ----        >24 Y      ----     < 40     40-60      >60       HDL   Date Value Ref Range Status   02/16/2022 41.3 mg/dL Final     Comment:     .      AGE      VERY LOW   LOW     NORMAL    HIGH       0-19 Y       < 35   < 40     40-45     ----    20-24 Y       ----   < 40       >45     ----      >24 Y       ----    "< 40     40-60      >60  .     09/03/2021 40.5 mg/dL Final     Comment:     .      AGE      VERY LOW   LOW     NORMAL    HIGH       0-19 Y       < 35   < 40     40-45     ----    20-24 Y       ----   < 40       >45     ----      >24 Y       ----   < 40     40-60      >60  .       No results found for: \"LDL\"  TSH   Date Value Ref Range Status   09/03/2021 2.57 0.44 - 3.98 mIU/L Final     Comment:      TSH testing is performed using different testing    methodology at [facility] than at other    [facility] hospitals. Direct result comparisons should    only be made within the same method.       No results found for: \"A1C\"  No components found for: \"POCA1C\"  No results found for: \"ALBUR\"  No components found for: \"POCALBUR\"      Other concerns: using xanax as needed  Fluoxetine-body tension  Sertraline-wt gain, fatigue  Lexapro-heavy period  Therapist -seeing one now  No psychiatrist  Fh mental illness: none  No suicidal thoughts or plans    In a work program with the zepbound; switching to wegovy  Seeing a nutritionist      ER/urgicare visits in the last year- cold   Hospitalizations in the last year- none      last Pap- never  H/o abn pap-n/a  Frequency-longer 45d  Duration-4-6d  Heavy periods-lighter lately with higher wt  Abn uterine bleeding-none  Dysmenorrhea-none  FH ovarian, endometrial, cervical, uterine ca-none    Current birth control method-none  No change in contraception desired    Last mammogram 5/3/24 SW  Self breast exams-occas    FH br ca-gr aunt pat      FH colon ca-none      Exercise- random-spinning and wts; worikng with nutritionist-[employer]  Diet-3 meals a day ; decr appetite with glp-1  Heading for 100g protein and lower carbs  Body mass index is 35.8 kg/m².    last eye dr appt- glasses; due  No vision issues    last dental appt-dec 2024    BMs-regular  Sleep-able to fall asleep and  stay asleep; no snoring or apnea; trazodone if not working caused anxiety so stopped taking it. " 16yo cat up at 5a  no cp, swelling, sob, abd pain, n/v/d/c, blood in stool or black stools  STI testing including hiv (age 15-65) and hep c screening (18-79)-declines        Immunization History   Administered Date(s) Administered    Flu vaccine, trivalent, preservative free, age 6 months and greater (Fluarix/Fluzone/Flulaval) 09/03/2024    Flu vaccine, trivalent, preservative free, no egg protein, age 6 months or greater (Flucelvax) 10/30/2023    Influenza, seasonal, injectable 09/24/2021    Moderna SARS-CoV-2 Vaccination 04/14/2021, 05/15/2021, 01/10/2022    Tdap vaccine, age 7 year and older (BOOSTRIX, ADACEL) 05/22/2014, 04/18/2024       fractures in lifetime-finger, toe, arm  FH osteoporosis-none    FH heart attack, heart surgery-pgm,pgf, dad-mi  FH stroke-none  Ct cardiac score 3/2022=0    Sis htn and ifg    The 10-year ASCVD risk score (Fozia BEST, et al., 2019) is: 2.8%    Values used to calculate the score:      Age: 40 years      Sex: Female      Is Non- : No      Diabetic: No      Tobacco smoker: No      Systolic Blood Pressure: 132 mmHg      Is BP treated: Yes      HDL Cholesterol: 38.7 mg/dL      Total Cholesterol: 259 mg/dL        Review of Systems   Constitutional:  Negative for appetite change, chills, fatigue, fever and unexpected weight change.   HENT:  Negative for congestion, ear pain, sore throat and trouble swallowing.    Eyes:  Negative for pain, discharge and redness.   Respiratory:  Negative for cough and shortness of breath.    Cardiovascular:  Negative for chest pain and palpitations.   Gastrointestinal:  Negative for abdominal pain, blood in stool and vomiting.   Endocrine: Negative for polydipsia, polyphagia and polyuria.   Genitourinary:  Negative for dysuria, frequency, hematuria and urgency.   Musculoskeletal:  Negative for back pain and neck pain.   Skin:  Negative for rash and wound.   Allergic/Immunologic: Negative for immunocompromised state.   Neurological:   Negative for dizziness, syncope and headaches.   Hematological:  Negative for adenopathy. Does not bruise/bleed easily.   Psychiatric/Behavioral:  Negative for confusion and hallucinations.        Objective   Visit Vitals  BP (!) 132/91   Pulse 91   Temp 36.6 °C (97.8 °F)          BP Readings from Last 3 Encounters:   05/29/25 (!) 132/91   01/21/25 110/70   10/17/24 138/89     Wt Readings from Last 3 Encounters:   05/29/25 112 kg (247 lb 6.4 oz)   02/14/25 113 kg (250 lb)   01/21/25 117 kg (257 lb 6.4 oz)           Physical Exam  Constitutional:       General: She is not in acute distress.     Appearance: Normal appearance. She is not ill-appearing.   HENT:      Head: Normocephalic.      Right Ear: Tympanic membrane, ear canal and external ear normal.      Left Ear: Tympanic membrane, ear canal and external ear normal.      Nose: Nose normal.      Mouth/Throat:      Mouth: Mucous membranes are moist.      Pharynx: Oropharynx is clear.   Eyes:      Extraocular Movements: Extraocular movements intact.      Conjunctiva/sclera: Conjunctivae normal.      Pupils: Pupils are equal, round, and reactive to light.   Cardiovascular:      Rate and Rhythm: Normal rate and regular rhythm.      Heart sounds: Normal heart sounds. No murmur heard.  Pulmonary:      Effort: Pulmonary effort is normal. No respiratory distress.      Breath sounds: Normal breath sounds. No wheezing, rhonchi or rales.   Abdominal:      General: Bowel sounds are normal.      Palpations: Abdomen is soft. There is no mass.      Tenderness: There is no abdominal tenderness.   Musculoskeletal:         General: No swelling or tenderness. Normal range of motion.      Cervical back: Normal range of motion and neck supple.      Right lower leg: No edema.      Left lower leg: No edema.   Skin:     General: Skin is warm.      Findings: No rash.   Neurological:      General: No focal deficit present.      Mental Status: She is alert and oriented to person, place,  and time.      Cranial Nerves: No cranial nerve deficit.      Motor: No weakness.   Psychiatric:         Mood and Affect: Mood normal.         Behavior: Behavior normal.       Assessment/Plan   Diagnoses and all orders for this visit:  Routine general medical examination at a health care facility  -     Comprehensive Metabolic Panel; Future  -     CBC and Auto Differential; Future  -     Lipid Panel; Future  -     TSH with reflex to Free T4 if abnormal; Future  Anxiety  Benign essential hypertension  Encounter for screening mammogram for malignant neoplasm of breast  -     BI mammo bilateral screening tomosynthesis; Future  Mixed hyperlipidemia  BMI 35.0-35.9,adult  -     semaglutide, weight loss, (Wegovy) 0.5 mg/0.5 mL pen injector; Inject 0.5 mg under the skin 1 (one) time per week for 4 doses.  Class 2 severe obesity due to excess calories with serious comorbidity and body mass index (BMI) of 35.0 to 35.9 in adult  Encounter to establish care  -     Referral to Obstetrics; Future  Other orders  -     Follow Up In Primary Care - Established; Future

## 2025-05-29 ENCOUNTER — APPOINTMENT (OUTPATIENT)
Dept: PRIMARY CARE | Facility: CLINIC | Age: 40
End: 2025-05-29
Payer: COMMERCIAL

## 2025-05-29 VITALS
HEIGHT: 70 IN | WEIGHT: 247.4 LBS | BODY MASS INDEX: 35.42 KG/M2 | TEMPERATURE: 97.8 F | DIASTOLIC BLOOD PRESSURE: 85 MMHG | OXYGEN SATURATION: 97 % | SYSTOLIC BLOOD PRESSURE: 127 MMHG | HEART RATE: 91 BPM

## 2025-05-29 DIAGNOSIS — E78.2 MIXED HYPERLIPIDEMIA: ICD-10-CM

## 2025-05-29 DIAGNOSIS — E66.812 CLASS 2 SEVERE OBESITY DUE TO EXCESS CALORIES WITH SERIOUS COMORBIDITY AND BODY MASS INDEX (BMI) OF 35.0 TO 35.9 IN ADULT: ICD-10-CM

## 2025-05-29 DIAGNOSIS — Z12.31 ENCOUNTER FOR SCREENING MAMMOGRAM FOR MALIGNANT NEOPLASM OF BREAST: ICD-10-CM

## 2025-05-29 DIAGNOSIS — Z00.00 ROUTINE GENERAL MEDICAL EXAMINATION AT A HEALTH CARE FACILITY: Primary | ICD-10-CM

## 2025-05-29 DIAGNOSIS — Z76.89 ENCOUNTER TO ESTABLISH CARE: ICD-10-CM

## 2025-05-29 DIAGNOSIS — F41.9 ANXIETY: ICD-10-CM

## 2025-05-29 DIAGNOSIS — I10 BENIGN ESSENTIAL HYPERTENSION: ICD-10-CM

## 2025-05-29 DIAGNOSIS — E66.01 CLASS 2 SEVERE OBESITY DUE TO EXCESS CALORIES WITH SERIOUS COMORBIDITY AND BODY MASS INDEX (BMI) OF 35.0 TO 35.9 IN ADULT: ICD-10-CM

## 2025-05-29 PROCEDURE — 3079F DIAST BP 80-89 MM HG: CPT | Performed by: NURSE PRACTITIONER

## 2025-05-29 PROCEDURE — 3075F SYST BP GE 130 - 139MM HG: CPT | Performed by: NURSE PRACTITIONER

## 2025-05-29 PROCEDURE — 99396 PREV VISIT EST AGE 40-64: CPT | Performed by: NURSE PRACTITIONER

## 2025-05-29 PROCEDURE — 3008F BODY MASS INDEX DOCD: CPT | Performed by: NURSE PRACTITIONER

## 2025-05-29 PROCEDURE — 1036F TOBACCO NON-USER: CPT | Performed by: NURSE PRACTITIONER

## 2025-05-29 PROCEDURE — 3080F DIAST BP >= 90 MM HG: CPT | Performed by: NURSE PRACTITIONER

## 2025-05-29 PROCEDURE — 3074F SYST BP LT 130 MM HG: CPT | Performed by: NURSE PRACTITIONER

## 2025-05-29 RX ORDER — SEMAGLUTIDE 0.5 MG/.5ML
0.5 INJECTION, SOLUTION SUBCUTANEOUS WEEKLY
Start: 2025-05-29 | End: 2025-06-20

## 2025-05-29 ASSESSMENT — ANXIETY QUESTIONNAIRES
IF YOU CHECKED OFF ANY PROBLEMS ON THIS QUESTIONNAIRE, HOW DIFFICULT HAVE THESE PROBLEMS MADE IT FOR YOU TO DO YOUR WORK, TAKE CARE OF THINGS AT HOME, OR GET ALONG WITH OTHER PEOPLE: SOMEWHAT DIFFICULT
7. FEELING AFRAID AS IF SOMETHING AWFUL MIGHT HAPPEN: NOT AT ALL
3. WORRYING TOO MUCH ABOUT DIFFERENT THINGS: SEVERAL DAYS
4. TROUBLE RELAXING: MORE THAN HALF THE DAYS
5. BEING SO RESTLESS THAT IT IS HARD TO SIT STILL: SEVERAL DAYS
2. NOT BEING ABLE TO STOP OR CONTROL WORRYING: SEVERAL DAYS
6. BECOMING EASILY ANNOYED OR IRRITABLE: SEVERAL DAYS
1. FEELING NERVOUS, ANXIOUS, OR ON EDGE: MORE THAN HALF THE DAYS
GAD7 TOTAL SCORE: 8

## 2025-05-29 ASSESSMENT — PATIENT HEALTH QUESTIONNAIRE - PHQ9
2. FEELING DOWN, DEPRESSED OR HOPELESS: NOT AT ALL
4. FEELING TIRED OR HAVING LITTLE ENERGY: SEVERAL DAYS
9. THOUGHTS THAT YOU WOULD BE BETTER OFF DEAD, OR OF HURTING YOURSELF: NOT AT ALL
5. POOR APPETITE OR OVEREATING: SEVERAL DAYS
SUM OF ALL RESPONSES TO PHQ QUESTIONS 1-9: 6
3. TROUBLE FALLING OR STAYING ASLEEP OR SLEEPING TOO MUCH: SEVERAL DAYS
8. MOVING OR SPEAKING SO SLOWLY THAT OTHER PEOPLE COULD HAVE NOTICED. OR THE OPPOSITE, BEING SO FIGETY OR RESTLESS THAT YOU HAVE BEEN MOVING AROUND A LOT MORE THAN USUAL: NOT AT ALL
6. FEELING BAD ABOUT YOURSELF - OR THAT YOU ARE A FAILURE OR HAVE LET YOURSELF OR YOUR FAMILY DOWN: SEVERAL DAYS
SUM OF ALL RESPONSES TO PHQ9 QUESTIONS 1 AND 2: 1
1. LITTLE INTEREST OR PLEASURE IN DOING THINGS: SEVERAL DAYS
7. TROUBLE CONCENTRATING ON THINGS, SUCH AS READING THE NEWSPAPER OR WATCHING TELEVISION: SEVERAL DAYS

## 2025-05-29 NOTE — PATIENT INSTRUCTIONS
Send me a message with amount of vitamin d that you are taking.    See eye dr    See ob gyn for pap-ask for virgin speculum    Set up mammogram      Handouts given to pt:  physical handout      Labs- No appt needed:    You can use the lab in our building when fasting. The hrs are: Mon-Fri 7a-330p.  No Saturday hrs. Bring the paper order.   OR   [facility] Mon-Fri 7a-12p. No Saturday hrs. Bring the paper order.  OR   [facility] Mon-Fri 630a-530p or Sat 6:30a-12p. Bring the paper order  OR  [facility] Mon-FrI 7a-5p  [facility] Mon-Fri 730a-4p, Sat  8a-12p  [facility] [address] Mon-Thurs 630a-6p , Fri 630a-4p, Sat 8a-12p  [facility] [address] Mon-Fri 7a-630p and Sat. 7a-3p    Fasting is no food, drink, gum or mints other than water for 12 hrs.   Results will be back in 2-3 business days for most labs. It is always recommended for any orders (labs, xrays, ultrasounds,MRI, ct scan, procedures etc) to check with your insurance provider for expected costs or expenses to you.     Screenings:    To set up mammogram, call [phone]    You will get your results via phone from my medical assistant if you do not have MyChart.  OR  You will get your results via [employer]    If a result is urgent, I will call to speak to you.    Vaccines:  Up to date    General recommendations:  Exercise-cardio 4-5d/wk 30min each day  Diet-Breakfast-toast (my favorite Nichelle Alan Delighful Multigrain or Mann's Killer Bread Good Seed thin-sliced)/bagel/English muffin-whole wheat flour as a 1st ingredient or cereal/oatmeal/granola bar-fiber 4g or more or protein like eggs or peanut butter; optional veggies  Lunch-protein, 1/2c carb or 2 slices bread, veg 1c  Dinner-protein, fist sized carb, veg 1c  Fruit 2 a day  Dairy 2 a day-milk, soy milk, almond milk, cheese, yogurt, cottage cheese  Snacks-Protein-hard boiled egg, nuts (walnuts/almonds/pecans/pistachios 1/4c), hummus, beef/deer  jerky or meat sticks; vegetable, fruit, dairy-milk(1%, skim, almond, soy)/cheese (not a lot of cheddar)/yogurt (Greek is best-my favorite Dannon Fruit on the Bottom Greek)/cottage cheese 2%; triscuits/ popcorn/wheat thins have a lot of fiber; follow serving size on bag/box/container  increase water  Limit alcohol to 1 drink per day for women and 2 drinks per day for men (1 drink=12oz beer or 5oz wine or 1 1/2oz liquor)  Calcium: 500mg 1 twice a day if age 50 and younger and 600mg 1 twice a day if over age 50 (calcium citrate can be taken without food)  Vitamin D: 800-5000 IU/day  Limit salt to <2300mg a day if age 50 and under and <1500mg a day if over age 50/have high bp or diabetes or kidney disease  Recommend folate for childbearing age women 0.4mg per day (can be found in a multivitamin)  Recommend 18mg/dL of iron a day if age 50 and under and 8mg/dL a day if over age 50; take on an empty stomach at bedtime  Use sunscreen   Wear seatbelt  Recommend safe sex practices: using condoms everytime you have sex, discuss with a new partner about their past partners/history of STDs/drug use, avoid drinking alcohol or using drugs as this increases the chance that you will participate in high-risk sex, for oral sex help protect your mouth by having your partner use a condom (male or female), women should not douche after sex, be aware of your partner's body and your body-look for signs of a sore, blister, rash, or discharge, and have regular exams and periodic tests for STDs.  No distracted driving  No driving when under influence of substances  Wear a seatbelt  Eye dr every 1-2yrs  Dentist every 6-12 mon  Tetanus shot every 10yrs  Recommend flu vaccine in the fall  Appt in 6mon for follow up on bp, cholesterol, diet exercise and 1 year for physical      I will communicate with you via [employer] regarding messages and results. If you need help with this, you can call the support line at [phone].    IT WAS A PLEASURE TO  SEE YOU TODAY. THANK YOU FOR CHOOSING US FOR YOUR HEALTHCARE NEEDS.

## 2025-06-10 ENCOUNTER — HOSPITAL ENCOUNTER (OUTPATIENT)
Dept: RADIOLOGY | Facility: CLINIC | Age: 40
Discharge: HOME | End: 2025-06-10
Payer: COMMERCIAL

## 2025-06-10 VITALS — HEIGHT: 70 IN | WEIGHT: 247 LBS | BODY MASS INDEX: 35.36 KG/M2

## 2025-06-10 DIAGNOSIS — Z12.31 ENCOUNTER FOR SCREENING MAMMOGRAM FOR MALIGNANT NEOPLASM OF BREAST: ICD-10-CM

## 2025-06-10 PROCEDURE — 77063 BREAST TOMOSYNTHESIS BI: CPT | Performed by: STUDENT IN AN ORGANIZED HEALTH CARE EDUCATION/TRAINING PROGRAM

## 2025-06-10 PROCEDURE — 77067 SCR MAMMO BI INCL CAD: CPT | Performed by: STUDENT IN AN ORGANIZED HEALTH CARE EDUCATION/TRAINING PROGRAM

## 2025-06-10 PROCEDURE — 77067 SCR MAMMO BI INCL CAD: CPT

## 2025-06-11 ENCOUNTER — HOSPITAL ENCOUNTER (OUTPATIENT)
Dept: RADIOLOGY | Facility: EXTERNAL LOCATION | Age: 40
Discharge: HOME | End: 2025-06-11

## 2025-06-12 ENCOUNTER — APPOINTMENT (OUTPATIENT)
Dept: NUTRITION | Facility: CLINIC | Age: 40
End: 2025-06-12
Payer: COMMERCIAL

## 2025-06-12 DIAGNOSIS — E66.01 CLASS 2 SEVERE OBESITY DUE TO EXCESS CALORIES WITH SERIOUS COMORBIDITY AND BODY MASS INDEX (BMI) OF 35.0 TO 35.9 IN ADULT: Primary | ICD-10-CM

## 2025-06-12 DIAGNOSIS — E66.812 CLASS 2 SEVERE OBESITY DUE TO EXCESS CALORIES WITH SERIOUS COMORBIDITY AND BODY MASS INDEX (BMI) OF 35.0 TO 35.9 IN ADULT: Primary | ICD-10-CM

## 2025-06-12 PROCEDURE — 97803 MED NUTRITION INDIV SUBSEQ: CPT | Performed by: DIETITIAN, REGISTERED

## 2025-06-12 NOTE — PROGRESS NOTES
Reason for Nutrition Visit:  Pt is a 40 y.o. female being seen at [place]. Pt was referred by Preeti Garza, APRN-CNP effective 8.31.24.      1. Class 2 severe obesity due to excess calories with serious comorbidity and body mass index (BMI) of 35.0 to 35.9 in adult              Past Medical Hx:  Patient Active Problem List   Diagnosis    Acid reflux    Anxiety    Benign essential hypertension    Insomnia    Mixed hyperlipidemia    Nodule of middle lobe of right lung    Seasonal allergies    Splenomegaly    Class 2 severe obesity due to excess calories with serious comorbidity and body mass index (BMI) of 35.0 to 35.9 in adult    Routine general medical examination at a health care facility    Encounter for screening mammogram for malignant neoplasm of breast    BMI 35.0-35.9,adult        Current Outpatient Medications:     ALPRAZolam (Xanax) 0.25 mg tablet, Take 1 tablet (0.25 mg) by mouth 3 times a day as needed for anxiety., Disp: 30 tablet, Rfl: 1    calcium acetate 668 mg (169 mg calcium) tablet, Take 500 mg by mouth., Disp: , Rfl:     losartan (Cozaar) 100 mg tablet, TAKE 1 TABLET BY MOUTH EVERY DAY, Disp: 90 tablet, Rfl: 2    omega 3-dha-epa-fish oil (Fish OiL) 1,000 mg (120 mg-180 mg) capsule, Take 2 capsules (2,000 mg) by mouth 2 times a day., Disp: 120 capsule, Rfl: 11    semaglutide, weight loss, (Wegovy) 0.5 mg/0.5 mL pen injector, Inject 0.5 mg under the skin 1 (one) time per week for 4 doses., Disp: , Rfl:     traZODone (Desyrel) 50 mg tablet, Take 0.5-2 tablets ( mg) by mouth as needed at bedtime for sleep., Disp: 60 tablet, Rfl: 5    vitamin D3-vit K1-vit MK4-MK7 -1,500 mcg capsule, Take by mouth., Disp: , Rfl:      Anthropometrics:      Weight change:    Significant Weight Change: No   lb (79 kg)   10/2024 Weight: 278 lbs   11/2024 Weight: 273 lbs   12/2024 Weight: 261.5 lbs   01/2025 Weight: 257.5  02/2025 Weight: 250 lbs  03/2025 Weight: 245 lbs   04/2025 Weight: 241-245 lbs     05/2025 Weight: 242 lbs  06/2025 Weight: 242 lbs     Lab Results   Component Value Date    CHOL 259 (H) 08/29/2024    LDLF 102 (H) 02/16/2022    TRIG 460 (H) 08/29/2024    HDL 38.7 08/29/2024          Chemistry    Lab Results   Component Value Date/Time     09/03/2021 0830    K 4.1 09/03/2021 0830     09/03/2021 0830    CO2 26 09/03/2021 0830    BUN 10 09/03/2021 0830    CREATININE 0.91 09/03/2021 0830    Lab Results   Component Value Date/Time    CALCIUM 10.0 09/03/2021 0830    ALKPHOS 100 09/03/2021 0830    AST 19 09/03/2021 0830    ALT 20 09/03/2021 0830    BILITOT 0.7 09/03/2021 0830         Food and Nutrition Hx:  Pt states she has a hx of restrictive eating. She does at times  She has started on Zepbound. She is planning on going on a keto diet to  [employer].   Pt wakes up feeling okay. Pt wakes up at 6:00 am. 3 meals are consumed per day.   From the Keep in Balance Assessment -I overate after feeling “I just don’t care.” 3- several times a week     She has her GPL-1 will change to Oren.     Pt had a follow-up appointment.   She is trying to explore hunger on the weight loss medication. Pt states there is a fine line better hunger and too hungry on the medication. Sometimes she has to really go by time verse feel. She still overeats 3 times a week mostly due to stress and going into meals too hungry.     Exercise is 1-2 x a week for 30 minutes.   Meditations 0-1 x a week.    Pt wakes up at 6:00 am.     24 Diet Recall:  Meal 1: Breakfast is at 7:00- 8:00 to include Orgain Nutrition Drink (kcal 160, CHO 7. protein 20). There is no inulin.   Snack Cheddar or Mozzarella cheese and Ritz crackers (gluten)   Meal 2: Lunch is at 11:00- 1:00 to include salad 2 -3 cups of lettuce, 3 ounces of chicken, cxorn and beans with home-made dressing made with honey, garlic, and other ingredients or another Orgain Drink.    Pt energy drops in the afternoon. A snack is now consumed at 4:00 to include cheese  "and crackers (kcal 150-200, CHO 15, protein 7)   Meal 3: Dinner is between 6:00- 7:00 pm to include a home  meal or 3 -6 ounces of fish, 2 cups of vegetables such as broccoli, and 1 cup of potato or 0.25 cup of rice.    Snacks: no bedtime snack. She can consume another snack.   Beverages: 64 ounces water and coffee with fat free milk with syrup.  CHO intake may be lower and may effect sleep.       Allergies: None  Intolerance: None  Appetite: Good  Intake: >75%  GI Symptoms : bloating and constipation Frequency: frequent  Swallowing Difficulty: No problems with swallowing  Dentition : own    Types of Activities: House/Yard Work  Exercise 2 -3 times a week for 30 minutes of walking.     Sleep duration/quality : 7+ hours and disrupted sleepPt sleep between 10:00-6:00 am.   Sleep disorders: none    Supplements: Vitamin D, Calcium, and Fish Oil daily            Physical Feeling: Does not feel fullness and Physically full  Binging: Never  Cravings: Starchy  Energy Levels: Fluctuates    Food Preparation: Patient  Cooking Skills/Barriers: None reported  Grocery Shopping: Patient    Nutrition Focused Physical Exam:    Performed/Deferred: Deferred as pt visually appears well-nourished with no signs of malnutrition    Estimated Energy Needs:  Energy Needs   Calculated Energy Needs Using Equations  Height: 172.7 cm (5' 7.99\")  Weight Used for Equation Calculations: 124 kg (273 lb 5.9 oz)  Vernell Chew Equation (Overweight or Obese Patients): 1963  Equation Chosen to Use by RD: Isidro Omer  Activity Factor: 1.3  Total Energy Needs: 2550  Estimated Energy Needs  Total Energy Estimated Needs (kCal): 2000 kCal    Nutrition Diagnosis:    Nutrition Diagnosis     Patient has Nutrition Diagnosis Yes   Diagnosis Status (1) Active   Nutrition Diagnosis 1 Food and nutrition related knowledge deficit   Related to (1) how to eat for weight loss   As Evidenced by (1) reports by pt of the need to learn.   Additional Nutrition " Diagnosis Diagnosis 2   Diagnosis Status (2) Active   Nutrition Diagnosis 2 Physical inactivity   Related to (2) sedentary lifestyle   As Evidenced by (2) reported PAL level of 1.2-1.3.   Diagnosis Status (3) Active   Nutrition Diagnosis 3 Self monitoring deficit   Related to (3) a struggle with stopping eating when the body is physically full ~2-3 times a week   As Evidenced by (3) reports by pt of overating after feeling “I just don’t care.”     Diagnosis Status: New  Diagnosis 4: Altered  function related to weight loss medication that is impeding nutrition goals for weight loss as evidenced by reports by pt of daily diarrhea and gas.     Nutrition Interventions:  Medical nutrition therapy was given for wt loss, HLD, and HTN.     Nutrition Prescription:  1,500- 2,000 calories per day for 1 -2 lb wt loss per week. Adequate protein intake of 79 grams per day. Heart healthy meal plan with a low saturated fat intake to <5 -6 % of energy (less than 11 grams of saturated fat per day), reduced intake of added sugars (<10% of total energy), 25 grams of fiber with intake of viscous fiber to 5 g/day to 10 g/day, plant sterols/stanols to 2 g/day, 1.1 gram of omega three fatty acids, and a 1,500 mg sodium restriction.Increase response to hunger, taste satiety, physical fullness, and satiety.  Aim to discover food selections, amounts, and pattern to nourish the body and mind to improve energy, physical and mental stamina, contentment, and sometimes even sleep. Improve GI symptoms from weight loss medication.     Nutrition Prescription     Individualized Nutrition Prescription Provided for Oral nutrition   Individualized Nutrition Prescription Provided for Meal plan focused on weight loss with mindful eating strategies.   Nutrition Education     Nutrition Education Content Physical activity guidance   Goals exercise recommendation for health   Nutrition Education Application Nutrition related skill education   Goals  Mindfulness Eating Exercise-Fullness/Satiety- Physical fullness practice was conducted today to explore the various ways in which physical fullness can be experienced. A structured fullness scale was developed to help recognize physical cues that indicate the body has had enough nourishment. Some individuals may identify fullness as the sensation of food in the stomach or the absence of hunger, while others may observe signs of overeating, such as excessive fullness. It is important to approach these sensations with curiosity and compassion to better understand the factors influencing them. Additionally, the concept of satiety was introduced. Satiety refers to the state of being fully satisfied, beyond the point of hunger. It signals that the body has consumed enough, and no further intake is necessary. This sensation typically emerges 10-15 minutes after eating and is often accompanied by improved energy levels, reinforcing the physical cues associated with fullness and satiety. Goals and Action Steps: Utilize the fullness satiety meditation provided, committing to a regular practice six days per week to enhance awareness of fullness and satiety cues. Focus on physical fullness and satiety at one meal per day, using the fullness scale to assess initial signs of fullness in both stomach sensations and energy levels   Nutrition Counseling     Nutrition Counseling Strategies Nutrition counseling based on cognitive restructuring strategy   Goals Mindfulness Baesd Eating Awarness Training-The hunger awareness meditation can help us identify when we are physically hungry. A hunger scale was also created to help us identify when we need to eat. Try to eat mostly when you are physically hungry, noticing the differences between feelings of physical hunger, and other reasons or triggers for eating -- just because food is available, the clock says it’s time to eat, or perhaps because you’re bored or procrastinating! Is it  easier to notice physical hunger during the day or during the evening? When you are alone or with others? Notice what makes it easier or harder to pay attention to physical hunger.       Nutrition Monitoring and Evaluation    Knowledge Belief Attitude Determination     Monitoring and Evaluation Plan Food and nutrition knowledge; Physical activity   Criteria reduce occurrence of overeating (current pattern is 3 times a week) and increase awarness of moderate hungry while on a wt loss medication   Physical activity Consistency   Anthropometric measurements     Monitoring and Evaluation Plan Weight       Keep in Balance  I overate after feeling “I just don’t care.” 3- several times a week   I  had a “treat” without over-eating. 3- several times a week  I engaged in aerobic exercise for at least 20 minutes. 3- several times a week    Nutrition Goals:  Via teach back method patient verbalized understanding of the following topics:  1) Utilize the fullness satiety meditation provided, committing to a regular practice six days per week to enhance awareness of fullness and satiety cues.   2) Focus on physical fullness and satiety at one meal per day, using the fullness scale to assess initial signs of fullness in both stomach sensations and energy levels.   3) The recommendation for exercise and health is to participate in 150 minutes of moderate exercise per week. Consider walking 5 days a week for 30 minutes or more. Moderate exercise means that there is an increase in heart rate, yet a conversation could still be participated in. If walking for this time is not feasible then consider use of pedometer with aim for 7,000- 10,000 steps to meet this exercise recommendation.      Educational Handouts/Practices: Mindfulness-  Fullness Meditation and Practice   Hunger Awareness and Practice   Mindfulness: mindfully eating raisins   Mindfulness of the Breath, Chattering Mind, Keep in Balance Assessment  Exercise for Weight loss  recommendation  Plate Method (2022)   Inner/Outer [place], High Protein   Next session: Taste Satiety/check if 3 meals are consumed daily      Amanda Bonner MS, YUNGN, LD, MARCO A, MB-EAT-P  Advanced Practice Clinical Dietitian   Mindfulness-Based Eating Awareness Training Practitioner  [facility]   [facility] [place]   [email or web address]  Scheduling Line [phone]  Direct Line [phone]    Readiness to Change : Good  Level of Understanding : Good  Anticipated Compliant : Good

## 2025-06-23 ENCOUNTER — TELEPHONE (OUTPATIENT)
Dept: PRIMARY CARE | Facility: CLINIC | Age: 40
End: 2025-06-23
Payer: COMMERCIAL

## 2025-06-23 NOTE — TELEPHONE ENCOUNTER
Pls call pt. I got a faxed form about PA for zepbound  Is pt taking zepbound?  If yes who is prescribing it?

## 2025-07-11 ENCOUNTER — APPOINTMENT (OUTPATIENT)
Dept: NUTRITION | Facility: CLINIC | Age: 40
End: 2025-07-11
Payer: COMMERCIAL

## 2025-08-05 ENCOUNTER — TELEMEDICINE CLINICAL SUPPORT (OUTPATIENT)
Dept: NUTRITION | Facility: CLINIC | Age: 40
End: 2025-08-05
Payer: COMMERCIAL

## 2025-08-05 DIAGNOSIS — E66.812 CLASS 2 SEVERE OBESITY DUE TO EXCESS CALORIES WITH SERIOUS COMORBIDITY AND BODY MASS INDEX (BMI) OF 35.0 TO 35.9 IN ADULT: ICD-10-CM

## 2025-08-05 DIAGNOSIS — E66.01 CLASS 2 SEVERE OBESITY DUE TO EXCESS CALORIES WITH SERIOUS COMORBIDITY AND BODY MASS INDEX (BMI) OF 35.0 TO 35.9 IN ADULT: ICD-10-CM

## 2025-08-05 DIAGNOSIS — E66.812 CLASS 2 SEVERE OBESITY DUE TO EXCESS CALORIES WITH SERIOUS COMORBIDITY AND BODY MASS INDEX (BMI) OF 35.0 TO 35.9 IN ADULT: Primary | ICD-10-CM

## 2025-08-05 DIAGNOSIS — E66.01 CLASS 2 SEVERE OBESITY DUE TO EXCESS CALORIES WITH SERIOUS COMORBIDITY AND BODY MASS INDEX (BMI) OF 35.0 TO 35.9 IN ADULT: Primary | ICD-10-CM

## 2025-08-05 DIAGNOSIS — I10 BENIGN ESSENTIAL HYPERTENSION: ICD-10-CM

## 2025-08-05 DIAGNOSIS — E78.2 MIXED HYPERLIPIDEMIA: ICD-10-CM

## 2025-08-05 PROCEDURE — 97803 MED NUTRITION INDIV SUBSEQ: CPT | Mod: 95 | Performed by: DIETITIAN, REGISTERED

## 2025-08-05 NOTE — PROGRESS NOTES
Reason for Nutrition Visit:  Pt is a 40 y.o. female being seen at [place]. Pt was referred by Preeti Garza, JANICE-CNP effective 8.31.24.  A new referral is needed- message sent to PCP on 8/5/25.    1. Class 2 severe obesity due to excess calories with serious comorbidity and body mass index (BMI) of 35.0 to 35.9 in adult [E66.812, E66.01, Z68.35]           Past Medical Hx:  Patient Active Problem List   Diagnosis    Acid reflux    Anxiety    Benign essential hypertension    Insomnia    Mixed hyperlipidemia    Nodule of middle lobe of right lung    Seasonal allergies    Splenomegaly    Class 2 severe obesity due to excess calories with serious comorbidity and body mass index (BMI) of 35.0 to 35.9 in adult    Routine general medical examination at a health care facility    Encounter for screening mammogram for malignant neoplasm of breast    BMI 35.0-35.9,adult        Current Outpatient Medications:     ALPRAZolam (Xanax) 0.25 mg tablet, Take 1 tablet (0.25 mg) by mouth 3 times a day as needed for anxiety., Disp: 30 tablet, Rfl: 1    calcium acetate 668 mg (169 mg calcium) tablet, Take 500 mg by mouth., Disp: , Rfl:     losartan (Cozaar) 100 mg tablet, TAKE 1 TABLET BY MOUTH EVERY DAY, Disp: 90 tablet, Rfl: 2    omega 3-dha-epa-fish oil (Fish OiL) 1,000 mg (120 mg-180 mg) capsule, Take 2 capsules (2,000 mg) by mouth 2 times a day., Disp: 120 capsule, Rfl: 11    semaglutide, weight loss, (Wegovy) 0.5 mg/0.5 mL pen injector, Inject 0.5 mg under the skin 1 (one) time per week for 4 doses., Disp: , Rfl:     traZODone (Desyrel) 50 mg tablet, Take 0.5-2 tablets ( mg) by mouth as needed at bedtime for sleep., Disp: 60 tablet, Rfl: 5    vitamin D3-vit K1-vit MK4-MK7 -1,500 mcg capsule, Take by mouth., Disp: , Rfl:      Anthropometrics:      Weight change:    Significant Weight Change: No   lb (79 kg)   10/2024 Weight: 278 lbs   11/2024 Weight: 273 lbs   12/2024 Weight: 261.5 lbs   01/2025 Weight:  257.5  02/2025 Weight: 250 lbs  03/2025 Weight: 245 lbs   04/2025 Weight: 241-245 lbs    05/2025 Weight: 242 lbs  06/2025 Weight: 242 lbs   08/2025 Weight: 247 lbs     Lab Results   Component Value Date    CHOL 259 (H) 08/29/2024    LDLF 102 (H) 02/16/2022    TRIG 460 (H) 08/29/2024    HDL 38.7 08/29/2024          Chemistry    Lab Results   Component Value Date/Time     09/03/2021 0830    K 4.1 09/03/2021 0830     09/03/2021 0830    CO2 26 09/03/2021 0830    BUN 10 09/03/2021 0830    CREATININE 0.91 09/03/2021 0830    Lab Results   Component Value Date/Time    CALCIUM 10.0 09/03/2021 0830    ALKPHOS 100 09/03/2021 0830    AST 19 09/03/2021 0830    ALT 20 09/03/2021 0830    BILITOT 0.7 09/03/2021 0830         Food and Nutrition Hx:  Pt states she has a hx of restrictive eating. She does at times  She has started on Zepbound. She is planning on going on a keto diet to  [email or web address].   Pt wakes up feeling okay. Pt wakes up at 6:00 am. 3 meals are consumed per day.   From the Keep in Balance Assessment -I overate after feeling “I just don’t care.” 3- several times a week   She has her GPL-1 will change to Weygovy.     Pt had a follow-up appointment.   Pt states she needs a reset.   Pt states she had 3 weeks with food noise. On this Weygovy, she has more cravings.   She is trying to explore hunger on the weight loss medication. Pt can feel more hungry on this medication. On this medication she can stop when she feels full.   She is not overeating but is just snacking.     Pt states there is a fine line better hunger and too hungry on the medication. Sometimes she has to really go by time verse feel. She still overeats 3 times a week mostly due to stress and going into meals too hungry.     Exercise is 0-1x a week for 30 minutes.   Meditations 0-1 x a week.    Pt wakes up at 6:00 am.     24 Diet Recall:  Meal 1: Breakfast is at 7:00- 8:00 to include Orgain Nutrition Drink (kcal 160, CHO 7. protein 20). There  "is no inulin.   Snack Cheddar or Mozzarella cheese and Ritz crackers (gluten)   Meal 2: Lunch is at 11:00- 1:00 to include salad 2 -3 cups of lettuce, 3 ounces of chicken, corn and beans with home-made dressing made with honey, garlic, and other ingredients or another Orgain Drink.  Lunch may be cheese and crackers, cheese sandwich, or fruit.   Pt energy drops in the afternoon-it may be chips.   Meal 3: Dinner is between 6:00- 7:00 pm to include a home  meal or 3 -6 ounces of fish, 2 cups of vegetables such as broccoli, and 1 cup of potato or 0.25 cup of rice.    Snacks: no bedtime snack. She can consume another snack.   Beverages: 64 ounces water and coffee with fat free milk with syrup.  CHO intake may be lower and may effect sleep.       Allergies: None  Intolerance: None  Appetite: Good  Intake: >75%  GI Symptoms : bloating and constipation Frequency: frequent  Swallowing Difficulty: No problems with swallowing  Dentition : own    Types of Activities: House/Yard Work  Exercise 2 -3 times a week for 30 minutes of walking.     Sleep duration/quality : 7+ hours and disrupted sleepPt sleep between 10:00-6:00 am.   Sleep disorders: none    Supplements: Vitamin D, Calcium, and Fish Oil daily            Physical Feeling: Does not feel fullness and Physically full  Binging: Never  Cravings: Starchy  Energy Levels: Fluctuates    Food Preparation: Patient  Cooking Skills/Barriers: None reported  Grocery Shopping: Patient    Nutrition Focused Physical Exam:    Performed/Deferred: Deferred as pt visually appears well-nourished with no signs of malnutrition    Estimated Energy Needs:  Energy Needs   Calculated Energy Needs Using Equations  Height: 172.7 cm (5' 7.99\")  Weight Used for Equation Calculations: 124 kg (273 lb 5.9 oz)  Vernell Chew Equation (Overweight or Obese Patients): 1963  Equation Chosen to Use by RD: Isidro Omer  Activity Factor: 1.3  Total Energy Needs: 2550  Estimated Energy Needs  Total " Energy Estimated Needs (kCal): 2000 kCal    Nutrition Diagnosis:    Nutrition Diagnosis     Patient has Nutrition Diagnosis Yes   Diagnosis Status (1) Active   Nutrition Diagnosis 1 Food and nutrition related knowledge deficit   Related to (1) how to eat for weight loss   As Evidenced by (1) reports by pt of the need to learn.   Additional Nutrition Diagnosis Diagnosis 2   Diagnosis Status (2) Active   Nutrition Diagnosis 2 Physical inactivity   Related to (2) sedentary lifestyle   As Evidenced by (2) reported PAL level of 1.2-1.3.   Diagnosis Status (3) Active   Nutrition Diagnosis 3 Self monitoring deficit   Related to (3) a struggle with stopping eating when the body is physically full ~2-3 times a week   As Evidenced by (3) reports by pt of overating after feeling “I just don’t care.”     Diagnosis Status: New  Diagnosis 4: Altered  function related to weight loss medication that is impeding nutrition goals for weight loss as evidenced by reports by pt of daily diarrhea and gas.     Nutrition Interventions:  Medical nutrition therapy was given for wt loss, HLD, and HTN.     Nutrition Prescription:  1,500- 2,000 calories per day for 1 -2 lb wt loss per week. Adequate protein intake of 79 grams per day. Heart healthy meal plan with a low saturated fat intake to <5 -6 % of energy (less than 11 grams of saturated fat per day), reduced intake of added sugars (<10% of total energy), 25 grams of fiber with intake of viscous fiber to 5 g/day to 10 g/day, plant sterols/stanols to 2 g/day, 1.1 gram of omega three fatty acids, and a 1,500 mg sodium restriction.Increase response to hunger, taste satiety, physical fullness, and satiety.  Aim to discover food selections, amounts, and pattern to nourish the body and mind to improve energy, physical and mental stamina, contentment, and sometimes even sleep. Improve GI symptoms from weight loss medication.     Nutrition Prescription     Individualized Nutrition Prescription  Provided for Oral nutrition   Individualized Nutrition Prescription Provided for Meal plan for weight loss while on a GLP-1   Food and Nutrition Delivery     Meals & Snacks Protein-modified diet   Goals 1) Strive to include protein at all meals and snacks. Use the list to help guide intake.   Nutrition Education     Nutrition Education Content Physical activity guidance   Goals 2) The recommendation for exercise and health is to participate in 150 minutes of moderate exercise per week. Consider walking 4 days a week for 38 minutes or more. Moderate exercise means that there is an increase in heart rate, yet a conversation could still be participated in. If walking for this time is not feasible then consider use of pedometer with aim for 7,000- 10,000 steps to meet this exercise recommendation.   Nutrition Counseling     Nutrition Counseling Strategies Nutrition counseling based on cognitive restructuring strategy   Goals Mindfulness Baesd Eating Awarness Training-Strive to incorporate meditation 3 days a week on Saturday, Sunday and Wednesday.       Nutrition Monitoring and Evaluation    Food and Nutrient Intake     Monitoring and Evaluation Plan Protein intake   Criteria at all meals and snacks   Knowledge Belief Attitude Determination     Monitoring and Evaluation Plan Food and nutrition knowledge; Physical activity   Food and nutrition knowledge Nutrition knowledge of individual client   Criteria reduce occurrence of overeating (current pattern is 3 times a week) RESOLVED and increase awarness of moderate hungry while on a wt loss medication   Physical activity Consistency   Criteria recommendation for exercise and health   Anthropometric measurements     Monitoring and Evaluation Plan Weight     Reduce occurrence of overeating (current pattern is 3 times a week) RESOLVED    Keep in Balance  I overate after feeling “I just don’t care.” 3- several times a week   I  had a “treat” without over-eating. 3- several  times a week  I engaged in aerobic exercise for at least 20 minutes. 3- several times a week    Nutrition Goals:  Via teach back method patient verbalized understanding of the following topics:  1) Strive to include protein at all meals and snacks. Use the list to help guide intake.   2) The recommendation for exercise and health is to participate in 150 minutes of moderate exercise per week. Consider walking 4 days a week for 38 minutes or more. Moderate exercise means that there is an increase in heart rate, yet a conversation could still be participated in. If walking for this time is not feasible then consider use of pedometer with aim for 7,000- 10,000 steps to meet this exercise recommendation.    3) Strive to incorporate meditation 3 days a week on Saturday, Sunday and Wednesday.     Pt request the Patient Request for Email Communication, form A, and was given form B, that outlines email guidelines. Form A and form B was sent to pt on 8/5/25.     Educational Handouts/Practices: High Protein Food List   Fullness Meditation and Practice   Hunger Awareness and Practice   Mindfulness: mindfully eating raisins   Mindfulness of the Breath, Chattering Mind, Keep in Balance Assessment  Exercise for Weight loss recommendation  Plate Method (2022)   Inner/Outer [place], High Protein   Next session: Taste Satiety/check if 3 meals are consumed daily      Amanda Bonner, MS, RDN, LD, MARCO A, MB-EAT-P  Advanced Practice Clinical Dietitian   Mindfulness-Based Eating Awareness Training Practitioner  [facility]   [facility] [place]   [email or web address]  Scheduling Line [phone]  Direct Line [phone]    Readiness to Change : Good  Level of Understanding : Good  Anticipated Compliant : Good

## 2025-09-04 ENCOUNTER — APPOINTMENT (OUTPATIENT)
Dept: NUTRITION | Facility: CLINIC | Age: 40
End: 2025-09-04
Payer: COMMERCIAL

## 2025-09-17 ENCOUNTER — APPOINTMENT (OUTPATIENT)
Dept: NUTRITION | Facility: CLINIC | Age: 40
End: 2025-09-17
Payer: COMMERCIAL

## 2025-12-02 ENCOUNTER — APPOINTMENT (OUTPATIENT)
Dept: PRIMARY CARE | Facility: CLINIC | Age: 40
End: 2025-12-02
Payer: COMMERCIAL